# Patient Record
Sex: MALE | Race: WHITE | NOT HISPANIC OR LATINO | Employment: UNEMPLOYED | URBAN - METROPOLITAN AREA
[De-identification: names, ages, dates, MRNs, and addresses within clinical notes are randomized per-mention and may not be internally consistent; named-entity substitution may affect disease eponyms.]

---

## 2017-01-13 ENCOUNTER — OFFICE VISIT (OUTPATIENT)
Dept: FAMILY MEDICINE | Facility: CLINIC | Age: 62
End: 2017-01-13
Payer: COMMERCIAL

## 2017-01-13 VITALS
TEMPERATURE: 98.2 F | HEART RATE: 94 BPM | OXYGEN SATURATION: 97 % | HEIGHT: 71 IN | RESPIRATION RATE: 14 BRPM | BODY MASS INDEX: 27.3 KG/M2 | DIASTOLIC BLOOD PRESSURE: 90 MMHG | WEIGHT: 195 LBS | SYSTOLIC BLOOD PRESSURE: 142 MMHG

## 2017-01-13 DIAGNOSIS — I10 ESSENTIAL HYPERTENSION WITH GOAL BLOOD PRESSURE LESS THAN 140/90: Primary | ICD-10-CM

## 2017-01-13 PROCEDURE — 99214 OFFICE O/P EST MOD 30 MIN: CPT | Performed by: FAMILY MEDICINE

## 2017-01-13 RX ORDER — METOPROLOL SUCCINATE 100 MG/1
100 TABLET, EXTENDED RELEASE ORAL DAILY
Qty: 30 TABLET | Refills: 1 | Status: SHIPPED | OUTPATIENT
Start: 2017-01-13 | End: 2017-05-25

## 2017-01-13 NOTE — NURSING NOTE
"Chief Complaint   Patient presents with     Hypertension     follow up       Initial /90 mmHg  Pulse 94  Temp(Src) 98.2  F (36.8  C) (Oral)  Resp 14  Ht 5' 11\" (1.803 m)  Wt 195 lb (88.451 kg)  BMI 27.21 kg/m2  SpO2 97% Estimated body mass index is 27.21 kg/(m^2) as calculated from the following:    Height as of this encounter: 5' 11\" (1.803 m).    Weight as of this encounter: 195 lb (88.451 kg).  BP completed using cuff size: regular    Health Maintenance that is potentially due pending provider review:  Health Maintenance Due   Topic Date Due     ADVANCE DIRECTIVE PLANNING Q5 YRS (NO INBASKET)  09/06/1973     HEPATITIS C SCREENING  09/06/1973         Per provider    "

## 2017-01-13 NOTE — PROGRESS NOTES
"  SUBJECTIVE:                                                    Darron Schwarz is a 61 year old male who presents to clinic today for the following health issues:      Hypertension Follow-up      Outpatient blood pressures are being checked at home.  Results are 140\"s / 90\"s.    Low Salt Diet: no added salt       Amount of exercise or physical activity: 4-5 days/week for an average of 45-60 minutes    Problems taking medications regularly: No    Medication side effects: none    Diet: low salt        Problem list and histories reviewed & adjusted, as indicated.  Additional history: as documented    Patient Active Problem List   Diagnosis     Hypertension goal BP (blood pressure) < 140/90     Hyperlipidemia LDL goal <130     Moderate major depression (H)     Adjustment disorder with anxiety     Essential hypertension with goal blood pressure less than 140/90     Moderate episode of recurrent major depressive disorder (H)     Pain in thoracic spine     Past Surgical History   Procedure Laterality Date     C excisn coarct aorta drct anast       Hx aortic coaraction repair age 19     Hc tooth extraction w/forcep  age 21     wisdom teeth       Social History   Substance Use Topics     Smoking status: Never Smoker      Smokeless tobacco: Never Used     Alcohol Use: 0.0 oz/week     0 Standard drinks or equivalent per week      Comment: 2 glasses of wine per day     Family History   Problem Relation Age of Onset     Alcohol/Drug Father      Alcohol/Drug Brother      Psychotic Disorder Mother      Cardiovascular Sister      Cardiovascular Brother      Hypertension Sister      Hypertension Brother      Alzheimer Disease Sister          Current Outpatient Prescriptions   Medication Sig Dispense Refill     metoprolol (TOPROL-XL) 100 MG 24 hr tablet Take 1 tablet (100 mg) by mouth daily 30 tablet 1     HYDROcodone-acetaminophen (NORCO) 5-325 MG per tablet Take 1-2 tablets by mouth every 8 hours as needed for moderate to " severe pain maximum 2 tablet(s) per day 20 tablet 0     metoprolol (TOPROL-XL) 50 MG 24 hr tablet Take 1 tablet (50 mg) by mouth daily 90 tablet 1     lisinopril (PRINIVIL,ZESTRIL) 40 MG tablet Take 1 tablet (40 mg) by mouth daily 90 tablet 1     VITAMIN C 500 MG PO TABS 1 TABLET DAILY       tobramycin (TOBREX) 0.3 % ophthalmic solution Apply 1 drop to eye every 4 hours for 7 days. 1 Bottle 0     fenofibrate (TRICOR) 145 MG tablet Take 1 tablet by mouth daily. 90 tablet 1     FLUoxetine (PROZAC) 10 MG tablet Take 1 tablet by mouth daily. 90 tablet 1     Allergies   Allergen Reactions     Atorvastatin Calcium      malaise     Recent Labs   Lab Test  11/11/16   0931  09/21/16   0824 02/10/16 01/12/15  02/06/13   1023   02/23/10   0907   LDL   --   108*  110  101  93   < >  131*   HDL   --   46  40  39*  35*   < >  31*   TRIG   --   131  202*  145  111   < >  135   ALT  32  29   --    --   32   < >   --    CR  0.82  0.83   --    --   1.04   < >  1.16   GFRESTIMATED  >90  Non  GFR Calc    >90  Non  GFR Calc     --    --   74   < >  66   GFRESTBLACK  >90   GFR Calc    >90   GFR Calc     --    --   89   < >  79   POTASSIUM  4.9  4.2   --    --   5.2   < >  4.3   TSH   --    --    --    --    --    --   2.09    < > = values in this interval not displayed.      BP Readings from Last 3 Encounters:   01/13/17 142/90   11/11/16 150/90   09/09/16 140/90    Wt Readings from Last 3 Encounters:   01/13/17 195 lb (88.451 kg)   11/11/16 196 lb (88.905 kg)   09/09/16 191 lb (86.637 kg)                  Labs reviewed in EPIC  Problem list, Medication list, Allergies, and Medical/Social/Surgical histories reviewed in [facility] and updated as appropriate.    ROS:  Constitutional, HEENT, cardiovascular, pulmonary, GI, , musculoskeletal, neuro, skin, endocrine and psych systems are negative, except as otherwise noted.    OBJECTIVE:                                                  "   /90 mmHg  Pulse 94  Temp(Src) 98.2  F (36.8  C) (Oral)  Resp 14  Ht 5' 11\" (1.803 m)  Wt 195 lb (88.451 kg)  BMI 27.21 kg/m2  SpO2 97%  Body mass index is 27.21 kg/(m^2).  GENERAL: healthy, alert and no distress  EYES: Eyes grossly normal to inspection, PERRL and conjunctivae and sclerae normal  HENT: ear canals and TM's normal, nose and mouth without ulcers or lesions  NECK: no adenopathy, no asymmetry, masses, or scars and thyroid normal to palpation  RESP: lungs clear to auscultation - no rales, rhonchi or wheezes  CV: regular rate and rhythm, normal S1 S2, no S3 or S4, no murmur, click or rub, no peripheral edema and peripheral pulses strong  ABDOMEN: soft, nontender, no hepatosplenomegaly, no masses and bowel sounds normal  MS: no gross musculoskeletal defects noted, no edema  NEURO: Normal strength and tone, mentation intact and speech normal    Diagnostic Test Results:  No results found for this or any previous visit (from the past 24 hour(s)).     ASSESSMENT/PLAN:                                                        1. Essential hypertension with goal blood pressure less than 140/90  We discussed increasing the dose of the Metoprolol as currently his BP is high , he has been checking at home and seems to be running in the upper 140s and sometimes 150 over 90s , he used to be on Atenolol but then got switched to the metoprolol and has been on the 50mg XL dose for a while , he denies any side effects , no chest pain, no SOB .  - metoprolol (TOPROL-XL) 100 MG 24 hr tablet; Take 1 tablet (100 mg) by mouth daily  Dispense: 30 tablet; Refill: 1  Would need to come for a f/u appointment in 2 to 3 weeks ,  RTC if no improving or worsening.  Pt is aware  and comfortable with the current plan.      Eli Sue MD  [facility]  HPI      ROS      Physical Exam      "

## 2017-05-12 DIAGNOSIS — I10 ESSENTIAL HYPERTENSION WITH GOAL BLOOD PRESSURE LESS THAN 140/90: ICD-10-CM

## 2017-05-12 NOTE — TELEPHONE ENCOUNTER
Noted at 1/13/2017 appt with LS:   Essential hypertension with goal blood pressure less than 140/90  We discussed increasing the dose of the Metoprolol as currently his BP is high , he has been checking at home and   seems to be running in the upper 140s and sometimes 150 over 90s , he used to be on Atenolol but then got switched   to the metoprolol and has been on the 50mg XL dose for a while , he denies any side effects , no chest pain, no SOB .  - metoprolol (TOPROL-XL) 100 MG 24 hr tablet; Take 1 tablet (100 mg) by mouth daily Dispense: 30 tablet; Refill: 1  Would need to come for a f/u appointment in 2 to 3 weeks    Left non detailed VM for pt asking that they callback and schedule (med check)  Kamala GRANDA RN

## 2017-05-12 NOTE — TELEPHONE ENCOUNTER
Lisinopril       Last Written Prescription Date: 11/09/2016  Last Fill Quantity: 90, # refills: 1  Last Office Visit with G, P or [facility] prescribing provider: 01/13/2017       Potassium   Date Value Ref Range Status   11/11/2016 4.9 3.4 - 5.3 mmol/L Final     Creatinine   Date Value Ref Range Status   11/11/2016 0.82 0.66 - 1.25 mg/dL Final     BP Readings from Last 3 Encounters:   01/13/17 142/90   11/11/16 150/90   09/09/16 140/90

## 2017-05-16 RX ORDER — LISINOPRIL 40 MG/1
TABLET ORAL
Qty: 90 TABLET | Refills: 1 | Status: SHIPPED | OUTPATIENT
Start: 2017-05-16 | End: 2017-06-02

## 2017-05-16 NOTE — TELEPHONE ENCOUNTER
LS,  Left  #2 for pt  See below messages  No response from our outreach  Please advise on refill  Kamala GRANDA RN

## 2017-05-25 ENCOUNTER — TELEPHONE (OUTPATIENT)
Dept: FAMILY MEDICINE | Facility: CLINIC | Age: 62
End: 2017-05-25

## 2017-05-25 DIAGNOSIS — I10 ESSENTIAL HYPERTENSION WITH GOAL BLOOD PRESSURE LESS THAN 140/90: ICD-10-CM

## 2017-05-25 RX ORDER — METOPROLOL SUCCINATE 100 MG/1
100 TABLET, EXTENDED RELEASE ORAL DAILY
Qty: 90 TABLET | Refills: 1 | Status: SHIPPED | OUTPATIENT
Start: 2017-05-25 | End: 2017-12-11

## 2017-05-25 NOTE — TELEPHONE ENCOUNTER
Reason for Call:  Medication or medication refill:    Do you use a [facility]?  Name of the pharmacy and phone number for the current request:       [facility] [address] [place] [address] AT [address] [place] [address]        Name of the medication requested: metoprolol    Other request: pt has been sched for [facility] appt. Please refill rx's    Can we leave a detailed message on this number? YES    Phone number patient can be reached at: Home number on file [phone] (home)    Best Time: any    Call taken on 5/25/2017 at 12:24 PM by Blayne Lyons

## 2017-05-25 NOTE — TELEPHONE ENCOUNTER
LS,  Routing refill request to provider for review/approval because:  A break in medication  Last rxd 1/13/17 #30 R 1.  Please authorize if appropriate.  Thanks,  Diamante Ortiz RN      metoprolol      Last Written Prescription Date: 1/13/17  Last Fill Quantity: 30, # refills: 0  Last Office Visit with [facility], [facility] or [facility] prescribing provider: 1/13/17  Next 5 appointments (look out 90 days)     Jun 02, 2017  9:00 AM CDT   Office Visit with Eli Sue MD   [facility] ([facility])    [address] [facility] [id number] [phone]                   Potassium   Date Value Ref Range Status   11/11/2016 4.9 3.4 - 5.3 mmol/L Final     Creatinine   Date Value Ref Range Status   11/11/2016 0.82 0.66 - 1.25 mg/dL Final     BP Readings from Last 3 Encounters:   01/13/17 142/90   11/11/16 150/90   09/09/16 140/90

## 2017-06-02 ENCOUNTER — OFFICE VISIT (OUTPATIENT)
Dept: FAMILY MEDICINE | Facility: CLINIC | Age: 62
End: 2017-06-02
Payer: COMMERCIAL

## 2017-06-02 VITALS
DIASTOLIC BLOOD PRESSURE: 92 MMHG | SYSTOLIC BLOOD PRESSURE: 130 MMHG | TEMPERATURE: 98.4 F | HEIGHT: 71 IN | OXYGEN SATURATION: 99 % | BODY MASS INDEX: 28.24 KG/M2 | WEIGHT: 201.7 LBS | HEART RATE: 84 BPM | RESPIRATION RATE: 15 BRPM

## 2017-06-02 DIAGNOSIS — I10 ESSENTIAL HYPERTENSION WITH GOAL BLOOD PRESSURE LESS THAN 140/90: ICD-10-CM

## 2017-06-02 LAB
ALBUMIN SERPL-MCNC: 4 G/DL (ref 3.4–5)
ALP SERPL-CCNC: 71 U/L (ref 40–150)
ALT SERPL W P-5'-P-CCNC: 41 U/L (ref 0–70)
ANION GAP SERPL CALCULATED.3IONS-SCNC: 10 MMOL/L (ref 3–14)
AST SERPL W P-5'-P-CCNC: 38 U/L (ref 0–45)
BILIRUB SERPL-MCNC: 0.6 MG/DL (ref 0.2–1.3)
BUN SERPL-MCNC: 14 MG/DL (ref 7–30)
CALCIUM SERPL-MCNC: 8.9 MG/DL (ref 8.5–10.1)
CHLORIDE SERPL-SCNC: 107 MMOL/L (ref 94–109)
CHOLEST SERPL-MCNC: 193 MG/DL
CO2 SERPL-SCNC: 26 MMOL/L (ref 20–32)
CREAT SERPL-MCNC: 0.96 MG/DL (ref 0.66–1.25)
GFR SERPL CREATININE-BSD FRML MDRD: 79 ML/MIN/1.7M2
GLUCOSE SERPL-MCNC: 78 MG/DL (ref 70–99)
HDLC SERPL-MCNC: 37 MG/DL
LDLC SERPL CALC-MCNC: 92 MG/DL
NONHDLC SERPL-MCNC: 156 MG/DL
POTASSIUM SERPL-SCNC: 4.7 MMOL/L (ref 3.4–5.3)
PROT SERPL-MCNC: 7.6 G/DL (ref 6.8–8.8)
SODIUM SERPL-SCNC: 143 MMOL/L (ref 133–144)
TRIGL SERPL-MCNC: 322 MG/DL

## 2017-06-02 PROCEDURE — 80061 LIPID PANEL: CPT | Performed by: FAMILY MEDICINE

## 2017-06-02 PROCEDURE — 36415 COLL VENOUS BLD VENIPUNCTURE: CPT | Performed by: FAMILY MEDICINE

## 2017-06-02 PROCEDURE — 99214 OFFICE O/P EST MOD 30 MIN: CPT | Performed by: FAMILY MEDICINE

## 2017-06-02 PROCEDURE — 80053 COMPREHEN METABOLIC PANEL: CPT | Performed by: FAMILY MEDICINE

## 2017-06-02 RX ORDER — LISINOPRIL 40 MG/1
40 TABLET ORAL DAILY
Qty: 30 TABLET | Refills: 1 | Status: SHIPPED | OUTPATIENT
Start: 2017-06-02 | End: 2017-06-02

## 2017-06-02 RX ORDER — LISINOPRIL 40 MG/1
40 TABLET ORAL DAILY
Qty: 90 TABLET | Refills: 1 | Status: SHIPPED | OUTPATIENT
Start: 2017-06-02 | End: 2017-12-11

## 2017-06-02 NOTE — PROGRESS NOTES
SUBJECTIVE:                                                    Darron Schwarz is a 61 year old male who presents to clinic today for the following health issues:      Hypertension Follow-up     Outpatient blood pressures are being checked at home.  Results are 140/80.    Low Salt Diet: not monitoring salt       Amount of exercise or physical activity: 4-5 days/week for an average of 45-60 minutes    Problems taking medications regularly: No (was out of his medications for a few days though, so he thinks that is why his numbers are a bit higher.)    Medication side effects: none    Diet: regular (no restrictions)          Problem list and histories reviewed & adjusted, as indicated.  Additional history: as documented    Patient Active Problem List   Diagnosis     Hypertension goal BP (blood pressure) < 140/90     Hyperlipidemia LDL goal <130     Moderate major depression (H)     Adjustment disorder with anxiety     Essential hypertension with goal blood pressure less than 140/90     Moderate episode of recurrent major depressive disorder (H)     Past Surgical History:   Procedure Laterality Date     C EXCISN COARCT AORTA DRCT ANAST      Hx aortic coaraction repair age 19     HC TOOTH EXTRACTION W/FORCEP  age 21    wisdom teeth       Social History   Substance Use Topics     Smoking status: Never Smoker     Smokeless tobacco: Never Used     Alcohol use 0.0 oz/week     0 Standard drinks or equivalent per week      Comment: 2 glasses of wine per day     Family History   Problem Relation Age of Onset     Psychotic Disorder Mother      Alcohol/Drug Father      Alcohol/Drug Brother      Cardiovascular Sister      Cardiovascular Brother      Hypertension Sister      Hypertension Brother      Alzheimer Disease Sister          Current Outpatient Prescriptions   Medication Sig Dispense Refill     lisinopril (PRINIVIL/ZESTRIL) 40 MG tablet Take 1 tablet (40 mg) by mouth daily 90 tablet 1     metoprolol (TOPROL-XL) 100 MG  24 hr tablet Take 1 tablet (100 mg) by mouth daily 90 tablet 1     VITAMIN C 500 MG PO TABS 1 TABLET DAILY       [DISCONTINUED] lisinopril (PRINIVIL/ZESTRIL) 40 MG tablet Take 1 tablet (40 mg) by mouth daily 30 tablet 1     [DISCONTINUED] lisinopril (PRINIVIL/ZESTRIL) 40 MG tablet TAKE 1 TABLET (40 MG) BY MOUTH DAILY 90 tablet 1     [DISCONTINUED] metoprolol (TOPROL-XL) 50 MG 24 hr tablet Take 1 tablet (50 mg) by mouth daily 90 tablet 1     Allergies   Allergen Reactions     Atorvastatin Calcium      malaise     Recent Labs   Lab Test  11/11/16   0931  09/21/16   0824 02/10/16 01/12/15  02/06/13   1023   02/23/10   0907   LDL   --   108*  110  101  93   < >  131*   HDL   --   46  40  39*  35*   < >  31*   TRIG   --   131  202*  145  111   < >  135   ALT  32  29   --    --   32   < >   --    CR  0.82  0.83   --    --   1.04   < >  1.16   GFRESTIMATED  >90  Non  GFR Calc    >90  Non  GFR Calc     --    --   74   < >  66   GFRESTBLACK  >90   GFR Calc    >90   GFR Calc     --    --   89   < >  79   POTASSIUM  4.9  4.2   --    --   5.2   < >  4.3   TSH   --    --    --    --    --    --   2.09    < > = values in this interval not displayed.      BP Readings from Last 3 Encounters:   06/02/17 (!) 130/92   01/13/17 142/90   11/11/16 150/90    Wt Readings from Last 3 Encounters:   06/02/17 201 lb 11.2 oz (91.5 kg)   01/13/17 195 lb (88.5 kg)   11/11/16 196 lb (88.9 kg)                  Labs reviewed in EPIC    Reviewed and updated as needed this visit by clinical staff  Tobacco  Allergies  Meds  Med Hx  Surg Hx  Fam Hx  Soc Hx      Reviewed and updated as needed this visit by Provider         ROS:  Constitutional, HEENT, cardiovascular, pulmonary, GI, , musculoskeletal, neuro, skin, endocrine and psych systems are negative, except as otherwise noted.    OBJECTIVE:                                                    BP (!) 130/92  Pulse 84  Temp 98.4  F  "(36.9  C) (Oral)  Resp 15  Ht 5' 11\" (1.803 m)  Wt 201 lb 11.2 oz (91.5 kg)  SpO2 99%  BMI 28.13 kg/m2  Body mass index is 28.13 kg/(m^2).  GENERAL: healthy, alert and no distress  EYES: Eyes grossly normal to inspection, PERRL and conjunctivae and sclerae normal  HENT: ear canals and TM's normal, nose and mouth without ulcers or lesions  NECK: no adenopathy, no asymmetry, masses, or scars and thyroid normal to palpation  RESP: lungs clear to auscultation - no rales, rhonchi or wheezes  CV: regular rate and rhythm, normal S1 S2, no S3 or S4, no murmur, click or rub, no peripheral edema and peripheral pulses strong  ABDOMEN: soft, nontender, no hepatosplenomegaly, no masses and bowel sounds normal  MS: no gross musculoskeletal defects noted, no edema  NEURO: Normal strength and tone, mentation intact and speech normal    Diagnostic Test Results:  No results found for this or any previous visit (from the past 24 hour(s)).     ASSESSMENT/PLAN:                                                        1. Essential hypertension with goal blood pressure less than 140/90  His BP is high today because he has been off his lisinopril, had five days off it before it was delivered by the mail order pharmacy, send a month Rx at [facility] so he can start right away , also recheck BP in one month here , sooner if any concerns or symptoms/  - lisinopril (PRINIVIL/ZESTRIL) 40 MG tablet; Take 1 tablet (40 mg) by mouth daily  Dispense: 90 tablet; Refill: 1  - Comprehensive metabolic panel (BMP + Alb, Alk Phos, ALT, AST, Total. Bili, TP)  - Lipid Profile (Chol, Trig, HDL, LDL calc)    In one month . Pt is aware  and comfortable with the current plan.      Eli Sue MD  [facility]    "

## 2017-06-02 NOTE — NURSING NOTE
"Chief Complaint   Patient presents with     Medication Reconciliation     Hypertension       Initial BP (!) 130/92  Pulse 84  Temp 98.4  F (36.9  C) (Oral)  Resp 15  Ht 5' 11\" (1.803 m)  Wt 201 lb 11.2 oz (91.5 kg)  SpO2 99%  BMI 28.13 kg/m2 Estimated body mass index is 28.13 kg/(m^2) as calculated from the following:    Height as of this encounter: 5' 11\" (1.803 m).    Weight as of this encounter: 201 lb 11.2 oz (91.5 kg).  Medication Reconciliation: complete    Health Maintenance Due Pending Provider Review:  PHQ9    Completed today.    Elodia Cheatham MA  [facility]  "

## 2017-06-02 NOTE — MR AVS SNAPSHOT
"              After Visit Summary   6/2/2017    Darron Schwarz    MRN: [id number]           Patient Information     Date Of Birth          1955        Visit Information        Provider Department      6/2/2017 9:00 AM Eli Sue MD [facility]        Today's Diagnoses     Essential hypertension with goal blood pressure less than 140/90           Follow-ups after your visit        Who to contact     If you have questions or need follow up information about today's clinic visit or your schedule please contact [facility] directly at [phone].  Normal or non-critical lab and imaging results will be communicated to you by [employer], letter or phone within 4 business days after the clinic has received the results. If you do not hear from us within 7 days, please contact the clinic through [employer] or phone. If you have a critical or abnormal lab result, we will notify you by phone as soon as possible.  Submit refill requests through [employer] or call your pharmacy and they will forward the refill request to us. Please allow 3 business days for your refill to be completed.          Additional Information About Your Visit        MyChart Information     [employer] gives you secure access to your electronic health record. If you see a primary care provider, you can also send messages to your care team and make appointments. If you have questions, please call your primary care clinic.  If you do not have a primary care provider, please call [phone] and they will assist you.        Care EveryWhere ID     This is your Care EveryWhere ID. This could be used by other organizations to access your [place] medical records  [id number]        Your Vitals Were     Pulse Temperature Respirations Height Pulse Oximetry BMI (Body Mass Index)    84 98.4  F (36.9  C) (Oral) 15 5' 11\" (1.803 m) 99% 28.13 kg/m2       Blood Pressure from Last 3 Encounters:   06/02/17 (!) 130/92   01/13/17 142/90   11/11/16 " 150/90    Weight from Last 3 Encounters:   06/02/17 201 lb 11.2 oz (91.5 kg)   01/13/17 195 lb (88.5 kg)   11/11/16 196 lb (88.9 kg)              We Performed the Following     Comprehensive metabolic panel (BMP + Alb, Alk Phos, ALT, AST, Total. Bili, TP)     Lipid Profile (Chol, Trig, HDL, LDL calc)          Today's Medication Changes          These changes are accurate as of: 6/2/17 10:40 AM.  If you have any questions, ask your nurse or doctor.               Start taking these medicines.        Dose/Directions    lisinopril 40 MG tablet   Commonly known as:  PRINIVIL/ZESTRIL   Used for:  Essential hypertension with goal blood pressure less than 140/90   Started by:  Eli Sue MD        Dose:  40 mg   Take 1 tablet (40 mg) by mouth daily   Quantity:  90 tablet   Refills:  1            Where to get your medicines      These medications were sent to [facility] - [address] [address]     Phone:  [phone]     lisinopril 40 MG tablet                Primary Care Provider    None       No address on file        Thank you!     Thank you for choosing [facility]  for your care. Our goal is always to provide you with excellent care. Hearing back from our patients is one way we can continue to improve our services. Please take a few minutes to complete the written survey that you may receive in the mail after your visit with us. Thank you!             Your Updated Medication List - Protect others around you: Learn how to safely use, store and throw away your medicines at www.disposemymeds.org.          This list is accurate as of: 6/2/17 10:40 AM.  Always use your most recent med list.                   Brand Name Dispense Instructions for use    ascorbic acid 500 MG tablet    VITAMIN C     1 TABLET DAILY       lisinopril 40 MG tablet    PRINIVIL/ZESTRIL    90 tablet    Take 1 tablet (40 mg) by mouth daily       metoprolol 100  MG 24 hr tablet    TOPROL-XL    90 tablet    Take 1 tablet (100 mg) by mouth daily

## 2017-06-03 ASSESSMENT — PATIENT HEALTH QUESTIONNAIRE - PHQ9: SUM OF ALL RESPONSES TO PHQ QUESTIONS 1-9: 0

## 2017-12-11 DIAGNOSIS — I10 ESSENTIAL HYPERTENSION WITH GOAL BLOOD PRESSURE LESS THAN 140/90: ICD-10-CM

## 2017-12-11 RX ORDER — METOPROLOL SUCCINATE 100 MG/1
TABLET, EXTENDED RELEASE ORAL
Qty: 30 TABLET | Refills: 0 | Status: SHIPPED | OUTPATIENT
Start: 2017-12-11 | End: 2018-01-24

## 2017-12-11 RX ORDER — LISINOPRIL 40 MG/1
TABLET ORAL
Qty: 30 TABLET | Refills: 0 | Status: SHIPPED | OUTPATIENT
Start: 2017-12-11 | End: 2018-01-24

## 2017-12-11 NOTE — TELEPHONE ENCOUNTER
Medication is being filled for 1 time refill only due to:  Patient needs to be seen because due for follow up.   Last OV 6/2/17. Follow up in 1 month.  Sarah Randolph RN    Lisinopril and Metoprolol      Last Written Prescription Date: 6/2/2017 - 5/25/2017  Last Fill Quantity: 90, # refills: 1  Last Office Visit with [facility], [facility] or [facility] prescribing provider: 6/2/2017       Potassium   Date Value Ref Range Status   06/02/2017 4.7 3.4 - 5.3 mmol/L Final     Creatinine   Date Value Ref Range Status   06/02/2017 0.96 0.66 - 1.25 mg/dL Final     BP Readings from Last 3 Encounters:   06/02/17 (!) 130/92   01/13/17 142/90   11/11/16 150/90

## 2018-01-24 ENCOUNTER — OFFICE VISIT (OUTPATIENT)
Dept: FAMILY MEDICINE | Facility: CLINIC | Age: 63
End: 2018-01-24
Payer: COMMERCIAL

## 2018-01-24 VITALS
WEIGHT: 216.5 LBS | DIASTOLIC BLOOD PRESSURE: 100 MMHG | TEMPERATURE: 97.5 F | SYSTOLIC BLOOD PRESSURE: 155 MMHG | OXYGEN SATURATION: 96 % | BODY MASS INDEX: 30.31 KG/M2 | HEART RATE: 89 BPM | HEIGHT: 71 IN

## 2018-01-24 DIAGNOSIS — I10 ESSENTIAL HYPERTENSION WITH GOAL BLOOD PRESSURE LESS THAN 140/90: ICD-10-CM

## 2018-01-24 DIAGNOSIS — Z23 NEED FOR INFLUENZA VACCINATION: Primary | ICD-10-CM

## 2018-01-24 PROCEDURE — 99213 OFFICE O/P EST LOW 20 MIN: CPT | Mod: 25 | Performed by: FAMILY MEDICINE

## 2018-01-24 PROCEDURE — 90471 IMMUNIZATION ADMIN: CPT | Performed by: FAMILY MEDICINE

## 2018-01-24 PROCEDURE — 90686 IIV4 VACC NO PRSV 0.5 ML IM: CPT | Performed by: FAMILY MEDICINE

## 2018-01-24 RX ORDER — METOPROLOL SUCCINATE 100 MG/1
100 TABLET, EXTENDED RELEASE ORAL DAILY
Qty: 10 TABLET | Refills: 0 | Status: SHIPPED | OUTPATIENT
Start: 2018-01-24 | End: 2018-09-04

## 2018-01-24 RX ORDER — METOPROLOL SUCCINATE 100 MG/1
100 TABLET, EXTENDED RELEASE ORAL DAILY
Qty: 90 TABLET | Refills: 1 | Status: SHIPPED | OUTPATIENT
Start: 2018-01-24 | End: 2018-01-24

## 2018-01-24 RX ORDER — LISINOPRIL 40 MG/1
40 TABLET ORAL DAILY
Qty: 90 TABLET | Refills: 1 | Status: SHIPPED | OUTPATIENT
Start: 2018-01-24 | End: 2018-01-24

## 2018-01-24 RX ORDER — METOPROLOL SUCCINATE 100 MG/1
100 TABLET, EXTENDED RELEASE ORAL DAILY
Qty: 90 TABLET | Refills: 1 | Status: SHIPPED | OUTPATIENT
Start: 2018-01-24 | End: 2018-08-31

## 2018-01-24 RX ORDER — METOPROLOL SUCCINATE 100 MG/1
100 TABLET, EXTENDED RELEASE ORAL DAILY
Qty: 10 TABLET | Refills: 0 | Status: SHIPPED | OUTPATIENT
Start: 2018-01-24 | End: 2018-01-24

## 2018-01-24 RX ORDER — LISINOPRIL 40 MG/1
40 TABLET ORAL DAILY
Qty: 90 TABLET | Refills: 1 | Status: SHIPPED | OUTPATIENT
Start: 2018-01-24 | End: 2018-10-02

## 2018-01-24 RX ORDER — LISINOPRIL 40 MG/1
40 TABLET ORAL DAILY
Qty: 30 TABLET | Refills: 0 | Status: CANCELLED | OUTPATIENT
Start: 2018-01-24

## 2018-01-24 NOTE — MR AVS SNAPSHOT
After Visit Summary   1/24/2018    Darron Schwarz    MRN: [id number]           Patient Information     Date Of Birth          1955        Visit Information        Provider Department      1/24/2018 12:30 PM Eli Sue MD [facility]        Today's Diagnoses     Need for influenza vaccination    -  1    Essential hypertension with goal blood pressure less than 140/90           Follow-ups after your visit        Future tests that were ordered for you today     Open Future Orders        Priority Expected Expires Ordered    Lipid Profile (Chol, Trig, HDL, LDL calc) Routine  6/24/2018 1/24/2018    Comprehensive metabolic panel (BMP + Alb, Alk Phos, ALT, AST, Total. Bili, TP) Routine  6/24/2018 1/24/2018    TSH Routine  6/24/2018 1/24/2018            Who to contact     If you have questions or need follow up information about today's clinic visit or your schedule please contact [facility] directly at [phone].  Normal or non-critical lab and imaging results will be communicated to you by [email or web address], letter or phone within 4 business days after the clinic has received the results. If you do not hear from us within 7 days, please contact the clinic through [employer] or phone. If you have a critical or abnormal lab result, we will notify you by phone as soon as possible.  Submit refill requests through [employer] or call your pharmacy and they will forward the refill request to us. Please allow 3 business days for your refill to be completed.          Additional Information About Your Visit        MyChart Information     [employer] gives you secure access to your electronic health record. If you see a primary care provider, you can also send messages to your care team and make appointments. If you have questions, please call your primary care clinic.  If you do not have a primary care provider, please call [phone] and they will assist you.        Care EveryWhere ID     This is  "your Care EveryWhere ID. This could be used by other organizations to access your [facility] medical records  [id number]        Your Vitals Were     Pulse Temperature Height Pulse Oximetry BMI (Body Mass Index)       89 97.5  F (36.4  C) (Oral) 5' 11\" (1.803 m) 96% 30.2 kg/m2        Blood Pressure from Last 3 Encounters:   01/24/18 (!) 155/100   06/02/17 (!) 130/92   01/13/17 142/90    Weight from Last 3 Encounters:   01/24/18 216 lb 8 oz (98.2 kg)   06/02/17 201 lb 11.2 oz (91.5 kg)   01/13/17 195 lb (88.5 kg)              We Performed the Following     HC FLU VAC PRESRV FREE QUAD SPLIT VIR 3+YRS IM     VACCINE ADMINISTRATION, INITIAL          Today's Medication Changes          These changes are accurate as of 1/24/18 11:59 PM.  If you have any questions, ask your nurse or doctor.               Start taking these medicines.        Dose/Directions    lisinopril 40 MG tablet   Commonly known as:  PRINIVIL/ZESTRIL   Used for:  Essential hypertension with goal blood pressure less than 140/90   Started by:  Eli Sue MD        Dose:  40 mg   Take 1 tablet (40 mg) by mouth daily   Quantity:  90 tablet   Refills:  1       * metoprolol succinate 100 MG 24 hr tablet   Commonly known as:  TOPROL-XL   Used for:  Essential hypertension with goal blood pressure less than 140/90   Started by:  Eli Sue MD        Dose:  100 mg   Take 1 tablet (100 mg) by mouth daily   Quantity:  90 tablet   Refills:  1       * metoprolol succinate 100 MG 24 hr tablet   Commonly known as:  TOPROL-XL   Used for:  Essential hypertension with goal blood pressure less than 140/90   Started by:  Eli Sue MD        Dose:  100 mg   Take 1 tablet (100 mg) by mouth daily   Quantity:  10 tablet   Refills:  0       * Notice:  This list has 2 medication(s) that are the same as other medications prescribed for you. Read the directions carefully, and ask your doctor or other care provider to review them with you.         Where to get your " medicines      These medications were sent to [facility] - [place], [place] - [address]  [address] [address]     Phone:  [phone]     lisinopril 40 MG tablet    metoprolol succinate 100 MG 24 hr tablet         These medications were sent to [id number] - [place] [address] & [facility]  [address] [place] [address]     Phone:  [phone]     metoprolol succinate 100 MG 24 hr tablet                Primary Care Provider Office Phone # Fax #    Eli Sue [phone] [address] [address]        Equal Access to Services     FEDERICO SESAY : Hadii aad ku hadasho Soomaali, waaxda luqadaha, qaybta kaalmada adeegyada, ari alan. So [facility] [phone].    ATENCIÓN: Si habla español, tiene a bowser disposición servicios gratuitos de asistencia lingüística. [facility] [phone].    We comply with applicable federal civil rights laws and [place] laws. We do not discriminate on the basis of race, color, national origin, age, disability, sex, sexual orientation, or gender identity.            Thank you!     Thank you for choosing [facility]  for your care. Our goal is always to provide you with excellent care. Hearing back from our patients is one way we can continue to improve our services. Please take a few minutes to complete the written survey that you may receive in the mail after your visit with us. Thank you!             Your Updated Medication List - Protect others around you: Learn how to safely use, store and throw away your medicines at www.disposemymeds.org.          This list is accurate as of 1/24/18 11:59 PM.  Always use your most recent med list.                   Brand Name Dispense Instructions for use Diagnosis    ascorbic acid 500 MG tablet    VITAMIN C     1 TABLET DAILY        lisinopril 40 MG  tablet    PRINIVIL/ZESTRIL    90 tablet    Take 1 tablet (40 mg) by mouth daily    Essential hypertension with goal blood pressure less than 140/90       * metoprolol succinate 100 MG 24 hr tablet    TOPROL-XL    90 tablet    Take 1 tablet (100 mg) by mouth daily    Essential hypertension with goal blood pressure less than 140/90       * metoprolol succinate 100 MG 24 hr tablet    TOPROL-XL    10 tablet    Take 1 tablet (100 mg) by mouth daily    Essential hypertension with goal blood pressure less than 140/90       * Notice:  This list has 2 medication(s) that are the same as other medications prescribed for you. Read the directions carefully, and ask your doctor or other care provider to review them with you.

## 2018-01-24 NOTE — PROGRESS NOTES
SUBJECTIVE:   Darron Schwarz is a 62 year old male who presents to clinic today for the following health issues:      Hypertension Follow-up      Outpatient blood pressures are being checked at home.  Results are 140s/80s.    Low Salt Diet: no added salt      Amount of exercise or physical activity: 3-4 days/week for an average of 45-60 minutes    Problems taking medications regularly: No    Medication side effects: none    Diet: regular (no restrictions)            Problem list and histories reviewed & adjusted, as indicated.  Additional history: as documented    Patient Active Problem List   Diagnosis     Hypertension goal BP (blood pressure) < 140/90     Hyperlipidemia LDL goal <130     Moderate major depression (H)     Adjustment disorder with anxiety     Essential hypertension with goal blood pressure less than 140/90     Moderate episode of recurrent major depressive disorder (H)     Past Surgical History:   Procedure Laterality Date     C EXCISN COARCT AORTA DRCT ANAST      Hx aortic coaraction repair age 19     HC TOOTH EXTRACTION W/FORCEP  age 21    wisdom teeth       Social History   Substance Use Topics     Smoking status: Never Smoker     Smokeless tobacco: Never Used     Alcohol use 0.0 oz/week     0 Standard drinks or equivalent per week      Comment: 2 glasses of wine per day     Family History   Problem Relation Age of Onset     Psychotic Disorder Mother      Alcohol/Drug Father      Alcohol/Drug Brother      Cardiovascular Sister      Cardiovascular Brother      Hypertension Sister      Hypertension Brother      Alzheimer Disease Sister          Current Outpatient Prescriptions   Medication Sig Dispense Refill     lisinopril (PRINIVIL/ZESTRIL) 40 MG tablet Take 1 tablet (40 mg) by mouth daily 90 tablet 1     metoprolol succinate (TOPROL-XL) 100 MG 24 hr tablet Take 1 tablet (100 mg) by mouth daily 90 tablet 1     metoprolol succinate (TOPROL-XL) 100 MG 24 hr tablet Take 1 tablet (100 mg) by  "mouth daily 10 tablet 0     VITAMIN C 500 MG PO TABS 1 TABLET DAILY       Allergies   Allergen Reactions     Atorvastatin Calcium      malaise     Recent Labs   Lab Test  06/02/17   0948  11/11/16   0931  09/21/16   0824 02/10/16   02/23/10   0907   LDL  92   --   108*  110   < >  131*   HDL  37*   --   46  40   < >  31*   TRIG  322*   --   131  202*   < >  135   ALT  41  32  29   --    < >   --    CR  0.96  0.82  0.83   --    < >  1.16   GFRESTIMATED  79  >90  Non  GFR Calc    >90  Non  GFR Calc     --    < >  66   GFRESTBLACK  >90   GFR Calc    >90   GFR Calc    >90   GFR Calc     --    < >  79   POTASSIUM  4.7  4.9  4.2   --    < >  4.3   TSH   --    --    --    --    --   2.09    < > = values in this interval not displayed.      BP Readings from Last 3 Encounters:   01/24/18 (!) 155/100   06/02/17 (!) 130/92   01/13/17 142/90    Wt Readings from Last 3 Encounters:   01/24/18 216 lb 8 oz (98.2 kg)   06/02/17 201 lb 11.2 oz (91.5 kg)   01/13/17 195 lb (88.5 kg)                  Labs reviewed in EPIC    Reviewed and updated as needed this visit by clinical staff  Tobacco       Reviewed and updated as needed this visit by Provider         ROS:  Constitutional, HEENT, cardiovascular, pulmonary, GI, , musculoskeletal, neuro, skin, endocrine and psych systems are negative, except as otherwise noted.    OBJECTIVE:     BP (!) 155/100  Pulse 89  Temp 97.5  F (36.4  C) (Oral)  Ht 5' 11\" (1.803 m)  Wt 216 lb 8 oz (98.2 kg)  SpO2 96%  BMI 30.2 kg/m2  Body mass index is 30.2 kg/(m^2).  GENERAL: healthy, alert and no distress  EYES: Eyes grossly normal to inspection, PERRL and conjunctivae and sclerae normal  HENT: normal cephalic/atraumatic, ear canals and TM's normal, nose and mouth without ulcers or lesions, oropharynx clear and oral mucous membranes moist  NECK: no adenopathy, no asymmetry, masses, or scars and thyroid normal to " palpation  RESP: lungs clear to auscultation - no rales, rhonchi or wheezes  CV: regular rate and rhythm, normal S1 S2, no S3 or S4, no murmur, click or rub, no peripheral edema and peripheral pulses strong  ABDOMEN: soft, nontender, no hepatosplenomegaly, no masses and bowel sounds normal  MS: no gross musculoskeletal defects noted, no edema  NEURO: Normal strength and tone, mentation intact and speech normal    Diagnostic Test Results:  Results for orders placed or performed in visit on 06/02/17   Comprehensive metabolic panel (BMP + Alb, Alk Phos, ALT, AST, Total. Bili, TP)   Result Value Ref Range    Sodium 143 133 - 144 mmol/L    Potassium 4.7 3.4 - 5.3 mmol/L    Chloride 107 94 - 109 mmol/L    Carbon Dioxide 26 20 - 32 mmol/L    Anion Gap 10 3 - 14 mmol/L    Glucose 78 70 - 99 mg/dL    Urea Nitrogen 14 7 - 30 mg/dL    Creatinine 0.96 0.66 - 1.25 mg/dL    GFR Estimate 79 >60 mL/min/1.7m2    GFR Estimate If Black >90   GFR Calc   >60 mL/min/1.7m2    Calcium 8.9 8.5 - 10.1 mg/dL    Bilirubin Total 0.6 0.2 - 1.3 mg/dL    Albumin 4.0 3.4 - 5.0 g/dL    Protein Total 7.6 6.8 - 8.8 g/dL    Alkaline Phosphatase 71 40 - 150 U/L    ALT 41 0 - 70 U/L    AST 38 0 - 45 U/L   Lipid Profile (Chol, Trig, HDL, LDL calc)   Result Value Ref Range    Cholesterol 193 <200 mg/dL    Triglycerides 322 (H) <150 mg/dL    HDL Cholesterol 37 (L) >39 mg/dL    LDL Cholesterol Calculated 92 <100 mg/dL    Non HDL Cholesterol 156 (H) <130 mg/dL       ASSESSMENT/PLAN:             1. Essential hypertension with goal blood pressure less than 140/90  Discussed BP is high , he has been off the metoprolol for 4 days , refilled, he will come back for fasting labs and then schedule his annual PE , we will discuss labs at that appointment .  - Lipid Profile (Chol, Trig, HDL, LDL calc); Future  - Comprehensive metabolic panel (BMP + Alb, Alk Phos, ALT, AST, Total. Bili, TP); Future  - TSH; Future  - VACCINE ADMINISTRATION, INITIAL  -  lisinopril (PRINIVIL/ZESTRIL) 40 MG tablet; Take 1 tablet (40 mg) by mouth daily  Dispense: 90 tablet; Refill: 1  - metoprolol succinate (TOPROL-XL) 100 MG 24 hr tablet; Take 1 tablet (100 mg) by mouth daily  Dispense: 90 tablet; Refill: 1  - metoprolol succinate (TOPROL-XL) 100 MG 24 hr tablet; Take 1 tablet (100 mg) by mouth daily  Dispense: 10 tablet; Refill: 0    2. Need for influenza vaccination  Got his flu shot   -  FLU VAC PRESRV FREE QUAD SPLIT VIR 3+YRS IM    RTC if no improving or worsening.  Pt is aware  and comfortable with the current plan.      Eli Sue MD  [facility]

## 2018-01-24 NOTE — NURSING NOTE
Chief Complaint   Patient presents with     Hypertension   Regular Adult Flu vaccine ordered by provider-verified by Adriana Vergara MA and given by ELIDA Quiñones CMA   following pt verification.ELIDA Quiñones CMA

## 2018-08-16 DIAGNOSIS — Z53.9 ERRONEOUS ENCOUNTER--DISREGARD: Primary | ICD-10-CM

## 2018-08-17 DIAGNOSIS — E78.5 HYPERLIPIDEMIA LDL GOAL <130: ICD-10-CM

## 2018-08-17 DIAGNOSIS — I10 HYPERTENSION GOAL BP (BLOOD PRESSURE) < 140/90: Primary | ICD-10-CM

## 2018-08-17 LAB
ALBUMIN SERPL-MCNC: 3.6 G/DL (ref 3.4–5)
ALP SERPL-CCNC: 83 U/L (ref 40–150)
ALT SERPL W P-5'-P-CCNC: 52 U/L (ref 0–70)
ANION GAP SERPL CALCULATED.3IONS-SCNC: 9 MMOL/L (ref 3–14)
AST SERPL W P-5'-P-CCNC: 32 U/L (ref 0–45)
BILIRUB SERPL-MCNC: 0.6 MG/DL (ref 0.2–1.3)
BUN SERPL-MCNC: 9 MG/DL (ref 7–30)
CALCIUM SERPL-MCNC: 8.8 MG/DL (ref 8.5–10.1)
CHLORIDE SERPL-SCNC: 105 MMOL/L (ref 94–109)
CHOLEST SERPL-MCNC: 160 MG/DL
CO2 SERPL-SCNC: 26 MMOL/L (ref 20–32)
CREAT SERPL-MCNC: 0.85 MG/DL (ref 0.66–1.25)
GFR SERPL CREATININE-BSD FRML MDRD: >90 ML/MIN/1.7M2
GLUCOSE SERPL-MCNC: 90 MG/DL (ref 70–99)
HDLC SERPL-MCNC: 25 MG/DL
LDLC SERPL CALC-MCNC: 74 MG/DL
NONHDLC SERPL-MCNC: 135 MG/DL
POTASSIUM SERPL-SCNC: 4.3 MMOL/L (ref 3.4–5.3)
PROT SERPL-MCNC: 7.5 G/DL (ref 6.8–8.8)
SODIUM SERPL-SCNC: 140 MMOL/L (ref 133–144)
TRIGL SERPL-MCNC: 304 MG/DL

## 2018-08-17 PROCEDURE — 80053 COMPREHEN METABOLIC PANEL: CPT | Performed by: FAMILY MEDICINE

## 2018-08-17 PROCEDURE — 80061 LIPID PANEL: CPT | Performed by: FAMILY MEDICINE

## 2018-08-17 PROCEDURE — 36415 COLL VENOUS BLD VENIPUNCTURE: CPT | Performed by: FAMILY MEDICINE

## 2018-08-31 ENCOUNTER — TELEPHONE (OUTPATIENT)
Dept: FAMILY MEDICINE | Facility: CLINIC | Age: 63
End: 2018-08-31

## 2018-08-31 DIAGNOSIS — I10 ESSENTIAL HYPERTENSION WITH GOAL BLOOD PRESSURE LESS THAN 140/90: ICD-10-CM

## 2018-08-31 RX ORDER — METOPROLOL SUCCINATE 100 MG/1
100 TABLET, EXTENDED RELEASE ORAL DAILY
Qty: 30 TABLET | Refills: 0 | Status: SHIPPED | OUTPATIENT
Start: 2018-08-31 | End: 2018-10-02

## 2018-08-31 NOTE — TELEPHONE ENCOUNTER
Reason for Call:  Medication or medication refill:  Do you use a [facility]?  Name of the pharmacy and phone number for the current request:       [facility]    Name of the medication requested: metoprolol succinate (TOPROL-XL) 100 MG 24 hr tablet       Other request: pt is out and would like this sent to [facility]    Can we leave a detailed message on this number? YES    Phone number patient can be reached at: Home number on file [phone] (home)    Best Time: any    Call taken on 8/31/2018 at 3:47 PM by Yulisa Phoenix

## 2018-08-31 NOTE — TELEPHONE ENCOUNTER
Prescription approved per [facility] Refill Protocol.  Pt informed Rx sent.  Kamala GRANDA RN

## 2018-09-04 ENCOUNTER — RADIANT APPOINTMENT (OUTPATIENT)
Dept: GENERAL RADIOLOGY | Facility: CLINIC | Age: 63
End: 2018-09-04
Attending: FAMILY MEDICINE
Payer: COMMERCIAL

## 2018-09-04 ENCOUNTER — OFFICE VISIT (OUTPATIENT)
Dept: FAMILY MEDICINE | Facility: CLINIC | Age: 63
End: 2018-09-04
Payer: COMMERCIAL

## 2018-09-04 DIAGNOSIS — F32.1 MODERATE MAJOR DEPRESSION (H): ICD-10-CM

## 2018-09-04 DIAGNOSIS — M10.072 ACUTE IDIOPATHIC GOUT OF LEFT FOOT: ICD-10-CM

## 2018-09-04 DIAGNOSIS — L21.9 SEBORRHEIC DERMATITIS OF SCALP: ICD-10-CM

## 2018-09-04 DIAGNOSIS — Z00.00 ENCOUNTER FOR ROUTINE ADULT HEALTH EXAMINATION WITHOUT ABNORMAL FINDINGS: Primary | ICD-10-CM

## 2018-09-04 DIAGNOSIS — E78.5 HYPERLIPIDEMIA LDL GOAL <130: ICD-10-CM

## 2018-09-04 DIAGNOSIS — I10 ESSENTIAL HYPERTENSION WITH GOAL BLOOD PRESSURE LESS THAN 140/90: ICD-10-CM

## 2018-09-04 LAB
PSA SERPL-ACNC: 2.76 UG/L (ref 0–4)
URATE SERPL-MCNC: 6.1 MG/DL (ref 3.5–7.2)

## 2018-09-04 PROCEDURE — 99396 PREV VISIT EST AGE 40-64: CPT | Performed by: FAMILY MEDICINE

## 2018-09-04 PROCEDURE — 84550 ASSAY OF BLOOD/URIC ACID: CPT | Performed by: FAMILY MEDICINE

## 2018-09-04 PROCEDURE — 36415 COLL VENOUS BLD VENIPUNCTURE: CPT | Performed by: FAMILY MEDICINE

## 2018-09-04 PROCEDURE — 99213 OFFICE O/P EST LOW 20 MIN: CPT | Mod: 25 | Performed by: FAMILY MEDICINE

## 2018-09-04 PROCEDURE — G0103 PSA SCREENING: HCPCS | Performed by: FAMILY MEDICINE

## 2018-09-04 PROCEDURE — 73630 X-RAY EXAM OF FOOT: CPT | Mod: LT

## 2018-09-04 RX ORDER — KETOCONAZOLE 20 MG/ML
SHAMPOO TOPICAL
Qty: 120 ML | Refills: 1 | Status: SHIPPED | OUTPATIENT
Start: 2018-09-04 | End: 2021-08-10

## 2018-09-04 NOTE — PROGRESS NOTES
SUBJECTIVE:   CC: Darron Schwarz is an 62 year old male who presents for preventative health visit.     Physical   Annual:     Getting at least 3 servings of Calcium per day:  Yes    Bi-annual eye exam:  NO    Dental care twice a year:  Yes    Sleep apnea or symptoms of sleep apnea:  None    Diet:  Regular (no restrictions)    Frequency of exercise:  4-5 days/week    Duration of exercise:  30-45 minutes    Taking medications regularly:  Yes    Medication side effects:  Other    Additional concerns today:  YES         Answers for HPI/ROS submitted by the patient on 9/4/2018   PHQ-2 Score: 0      1)Patient is concern about possible gout on left foot/toe. Had a painful attack a few weeks ago , now it is better , first MTP joint on the left foot, was red hot and painful, then he recovered but walked a lot over last weekend and now some more pian  Not as bad as before   2) HTN , seems well controlled at home , initially was high here but then rechecked went down   3) high lipids- maily triglycerides a nd low HDL but norml LDL       Today's PHQ-2 Score:   PHQ-2 ( 1999 Pfizer) 9/9/2016   Q1: Little interest or pleasure in doing things 0   Q2: Feeling down, depressed or hopeless 0   PHQ-2 Score 0       Abuse: Current or Past(Physical, Sexual or Emotional)- No  Do you feel safe in your environment - Yes    Social History   Substance Use Topics     Smoking status: Never Smoker     Smokeless tobacco: Never Used     Alcohol use 0.0 oz/week     0 Standard drinks or equivalent per week      Comment: 2 glasses of wine per day     No flowsheet data found.No flowsheet data found.    Last PSA:   PSA   Date Value Ref Range Status   05/03/2011 0.72 0 - 4 ug/L Final       Reviewed orders with patient. Reviewed health maintenance and updated orders accordingly - Yes  Labs reviewed in EPIC  BP Readings from Last 3 Encounters:   09/05/18 135/85   01/24/18 (!) 155/100   06/02/17 (!) 130/92    Wt Readings from Last 3 Encounters:    09/04/18 198 lb (89.8 kg)   01/24/18 216 lb 8 oz (98.2 kg)   06/02/17 201 lb 11.2 oz (91.5 kg)                  Patient Active Problem List   Diagnosis     Hypertension goal BP (blood pressure) < 140/90     Hyperlipidemia LDL goal <130     Moderate major depression (H)     Adjustment disorder with anxiety     Essential hypertension with goal blood pressure less than 140/90     Moderate episode of recurrent major depressive disorder (H)     Past Surgical History:   Procedure Laterality Date     C EXCISN COARCT AORTA DRCT ANAST      Hx aortic coaraction repair age 19     HC TOOTH EXTRACTION W/FORCEP  age 21    wisdom teeth       Social History   Substance Use Topics     Smoking status: Never Smoker     Smokeless tobacco: Never Used     Alcohol use 0.0 oz/week     0 Standard drinks or equivalent per week      Comment: 2 glasses of wine per day     Family History   Problem Relation Age of Onset     Psychotic Disorder Mother      Alcohol/Drug Father      Alcohol/Drug Brother      Cardiovascular Sister      Cardiovascular Brother      Hypertension Sister      Hypertension Brother      Alzheimer Disease Sister          Current Outpatient Prescriptions   Medication Sig Dispense Refill     ketoconazole (NIZORAL) 2 % shampoo Apply to the affected area and wash off after 5 minutes. 120 mL 1     lisinopril (PRINIVIL/ZESTRIL) 40 MG tablet Take 1 tablet (40 mg) by mouth daily 90 tablet 1     metoprolol succinate (TOPROL-XL) 100 MG 24 hr tablet Take 1 tablet (100 mg) by mouth daily 30 tablet 0     VITAMIN C 500 MG PO TABS 1 TABLET DAILY       Allergies   Allergen Reactions     Atorvastatin Calcium      malaise     Recent Labs   Lab Test  08/17/18   0905  06/02/17   0948  11/11/16   0931  09/21/16   0824   LDL  74  92   --   108*   HDL  25*  37*   --   46   TRIG  304*  322*   --   131   ALT  52  41  32  29   CR  0.85  0.96  0.82  0.83   GFRESTIMATED  >90  79  >90  Non  GFR Calc    >90  Non  GFR  Calc     GFRESTBLACK  >90  >90  African American GFR Calc    >90   GFR Calc    >90   GFR Calc     POTASSIUM  4.3  4.7  4.9  4.2        Reviewed and updated as needed this visit by clinical staff         Reviewed and updated as needed this visit by Provider        Past Medical History:   Diagnosis Date     Anxiety state, unspecified      Other and unspecified hyperlipidemia      Unspecified essential hypertension      Varicella without mention of complication       Past Surgical History:   Procedure Laterality Date     C EXCISN COARCT AORTA DRCT ANAST      Hx aortic coaraction repair age 19     HC TOOTH EXTRACTION W/FORCEP  age 21    wisdom teeth       Review of Systems  CONSTITUTIONAL: NEGATIVE for fever, chills, change in weight  INTEGUMENTARY/SKIN: NEGATIVE for worrisome rashes, moles or lesions  EYES: NEGATIVE for vision changes or irritation  ENT: NEGATIVE for ear, mouth and throat problems  RESP: NEGATIVE for significant cough or SOB  CV: NEGATIVE for chest pain, palpitations or peripheral edema  GI: NEGATIVE for nausea, abdominal pain, heartburn, or change in bowel habits   male: negative for dysuria, hematuria, decreased urinary stream, erectile dysfunction, urethral discharge  MUSCULOSKELETAL: NEGATIVE for significant arthralgias or myalgia  NEURO: NEGATIVE for weakness, dizziness or paresthesias  PSYCHIATRIC: NEGATIVE for changes in mood or affect    OBJECTIVE:   There were no vitals taken for this visit.    Physical Exam  GENERAL: healthy, alert and no distress  EYES: Eyes grossly normal to inspection, PERRL and conjunctivae and sclerae normal  HENT: ear canals and TM's normal, nose and mouth without ulcers or lesions  NECK: no adenopathy, no asymmetry, masses, or scars and thyroid normal to palpation  RESP: lungs clear to auscultation - no rales, rhonchi or wheezes  CV: regular rate and rhythm, normal S1 S2, no S3 or S4, no murmur, click or rub, no peripheral edema and  peripheral pulses strong  ABDOMEN: soft, nontender, no hepatosplenomegaly, no masses and bowel sounds normal  MS: no gross musculoskeletal defects noted, no edema  SKIN: no suspicious lesions or rashes  NEURO: Normal strength and tone, mentation intact and speech normal  PSYCH: mentation appears normal, affect normal/bright    Diagnostic Test Results:  Results for orders placed or performed in visit on 09/04/18   Uric acid   Result Value Ref Range    Uric Acid 6.1 3.5 - 7.2 mg/dL   PSA, screen   Result Value Ref Range    PSA 2.76 0 - 4 ug/L       ASSESSMENT/PLAN:   1. Encounter for routine adult health examination without abnormal findings  Healthy , will check labs today   - PSA, screen    2. Essential hypertension with goal blood pressure less than 140/90  Seems to be well controlled on lisinopril 40mg and also metoprolol  mg daily dose , when checked at home , here initially it was high but at the end of the visit was down to 135 over 85 , we discussed checking his BP at home and then sending the readings via [employer]     3. Moderate major depression (H)  Is controlled and he is off the SSRIs , used to be on Prozac but was at the time when he broke off with his partner of many yrs , now he is doing fine , no concerns.    4. Hyperlipidemia LDL goal <130  He checked lipids recently and his LDL is good , has low HDL and also some elevation of the triglycerides     5. Acute idiopathic gout of left foot  New for him , will do uric acid level and also X ray done today - no fracture per my reading , some DJD in 1st MTP joint- we discussed using Ibuprofen when the acute exacerbation happens but I have given him a referral for rheumatology as this is first diagnosis and no FH of gout .  - XR Foot Left G/E 3 Views; Future  - Uric acid  - RHEUMATOLOGY REFERRAL    6. Seborrheic dermatitis of scalp  Will try this , if no improvement we discussed derm referral.  - ketoconazole (NIZORAL) 2 % shampoo; Apply to the affected  "area and wash off after 5 minutes.  Dispense: 120 mL; Refill: 1    COUNSELING:   Reviewed preventive health counseling, as reflected in patient instructions       Regular exercise       Healthy diet/nutrition       Vision screening       Hearing screening    BP Readings from Last 1 Encounters:   01/24/18 (!) 155/100     Estimated body mass index is 30.2 kg/(m^2) as calculated from the following:    Height as of 1/24/18: 5' 11\" (1.803 m).    Weight as of 1/24/18: 216 lb 8 oz (98.2 kg).           reports that he has never smoked. He has never used smokeless tobacco.      Counseling Resources:  ATP IV Guidelines  Pooled Cohorts Equation Calculator  FRAX Risk Assessment  ICSI Preventive Guidelines  Dietary Guidelines for Americans, 2010  USDA's MyPlate  ASA Prophylaxis  Lung CA Screening    Eli Sue MD  [facility]  "

## 2018-09-04 NOTE — MR AVS SNAPSHOT
After Visit Summary   9/4/2018    Darron Schwarz    MRN: [id number]           Patient Information     Date Of Birth          1955        Visit Information        Provider Department      9/4/2018 9:00 AM Eli Sue MD [facility]        Today's Diagnoses     Encounter for routine adult health examination without abnormal findings    -  1    Essential hypertension with goal blood pressure less than 140/90        Moderate major depression (H)        Hyperlipidemia LDL goal <130        Acute idiopathic gout of left foot        Seborrheic dermatitis of scalp          Care Instructions      Preventive Health Recommendations  Male Ages 50 - 64    Yearly exam:             See your health care provider every year in order to  o   Review health changes.   o   Discuss preventive care.    o   Review your medicines if your doctor has prescribed any.     Have a cholesterol test every 5 years, or more frequently if you are at risk for high cholesterol/heart disease.     Have a diabetes test (fasting glucose) every three years. If you are at risk for diabetes, you should have this test more often.     Have a colonoscopy at age 50, or have a yearly FIT test (stool test). These exams will check for colon cancer.      Talk with your health care provider about whether or not a prostate cancer screening test (PSA) is right for you.    You should be tested each year for STDs (sexually transmitted diseases), if you re at risk.     Shots: Get a flu shot each year. Get a tetanus shot every 10 years.     Nutrition:    Eat at least 5 servings of fruits and vegetables daily.     Eat whole-grain bread, whole-wheat pasta and brown rice instead of white grains and rice.     Get adequate Calcium and Vitamin D.     Lifestyle    Exercise for at least 150 minutes a week (30 minutes a day, 5 days a week). This will help you control your weight and prevent disease.     Limit alcohol to one drink per day.     No  smoking.     Wear sunscreen to prevent skin cancer.     See your dentist every six months for an exam and cleaning.     See your eye doctor every 1 to 2 years.            Follow-ups after your visit        Additional Services     RHEUMATOLOGY REFERRAL       Your provider has referred you to: [facility]: Rheumatology Clinic - [place] [phone]   [email or web address]  [facility] [phone]   [email or web address]    Please be aware that coverage of these services is subject to the terms and limitations of your health insurance plan.  Call member services at your health plan with any benefit or coverage questions.      Please bring the following with you to your appointment:    (1) Any X-Rays, CTs or MRIs which have been performed.  Contact the facility where they were done to arrange for  prior to your scheduled appointment.    (2) List of current medications   (3) This referral request   (4) Any documents/labs given to you for this referral                  Who to contact     If you have questions or need follow up information about today's clinic visit or your schedule please contact [facility] directly at [phone].  Normal or non-critical lab and imaging results will be communicated to you by MyChart, letter or phone within 4 business days after the clinic has received the results. If you do not hear from us within 7 days, please contact the clinic through "[employer]hart or phone. If you have a critical or abnormal lab result, we will notify you by phone as soon as possible.  Submit refill requests through [employer] or call your pharmacy and they will forward the refill request to us. Please allow 3 business days for your refill to be completed.          Additional Information About Your Visit        "[employer]harb[employer]ombate Information     [employer] gives you secure access to your electronic health record. If you see a primary care  "provider, you can also send messages to your care team and make appointments. If you have questions, please call your primary care clinic.  If you do not have a primary care provider, please call [phone] and they will assist you.        Care EveryWhere ID     This is your Care EveryWhere ID. This could be used by other organizations to access your [place] medical records  [id number]        Your Vitals Were     Pulse Temperature Respirations Height Pulse Oximetry BMI (Body Mass Index)    89 98.1  F (36.7  C) (Oral) 16 5' 11.25\" (1.81 m) 98% 27.42 kg/m2       Blood Pressure from Last 3 Encounters:   09/04/18 (!) 162/97   01/24/18 (!) 155/100   06/02/17 (!) 130/92    Weight from Last 3 Encounters:   09/04/18 198 lb (89.8 kg)   01/24/18 216 lb 8 oz (98.2 kg)   06/02/17 201 lb 11.2 oz (91.5 kg)              We Performed the Following     PSA, screen     RHEUMATOLOGY REFERRAL     Uric acid          Today's Medication Changes          These changes are accurate as of 9/4/18 10:21 AM.  If you have any questions, ask your nurse or doctor.               Start taking these medicines.        Dose/Directions    ketoconazole 2 % shampoo   Commonly known as:  NIZORAL   Used for:  Seborrheic dermatitis of scalp   Started by:  Eli Sue MD        Apply to the affected area and wash off after 5 minutes.   Quantity:  120 mL   Refills:  1            Where to get your medicines      These medications were sent to [facility] [address] [address]     Phone:  [phone]     ketoconazole 2 % shampoo                Primary Care Provider Office Phone # Fax #    Eli Sue [phone] [address] [address]        Equal Access to Services     FEDERICO SESAY AH: Deneen hollando Soharjit, waaxda luqadaha, qaybta kaalmada hattie, ari alan. So wa " [phone].    ATENCIÓN: Si ksenia felipe, tiene a bowser disposición servicios gratuitos de asistencia lingüística. Klarissa guadarrama [phone].    We comply with applicable federal civil rights laws and [place] laws. We do not discriminate on the basis of race, color, national origin, age, disability, sex, sexual orientation, or gender identity.            Thank you!     Thank you for choosing [facility]  for your care. Our goal is always to provide you with excellent care. Hearing back from our patients is one way we can continue to improve our services. Please take a few minutes to complete the written survey that you may receive in the mail after your visit with us. Thank you!             Your Updated Medication List - Protect others around you: Learn how to safely use, store and throw away your medicines at www.disposemymeds.org.          This list is accurate as of 9/4/18 10:21 AM.  Always use your most recent med list.                   Brand Name Dispense Instructions for use Diagnosis    ascorbic acid 500 MG tablet    VITAMIN C     1 TABLET DAILY        ketoconazole 2 % shampoo    NIZORAL    120 mL    Apply to the affected area and wash off after 5 minutes.    Seborrheic dermatitis of scalp       lisinopril 40 MG tablet    PRINIVIL/ZESTRIL    90 tablet    Take 1 tablet (40 mg) by mouth daily    Essential hypertension with goal blood pressure less than 140/90       metoprolol succinate 100 MG 24 hr tablet    TOPROL-XL    30 tablet    Take 1 tablet (100 mg) by mouth daily    Essential hypertension with goal blood pressure less than 140/90

## 2018-09-05 VITALS
BODY MASS INDEX: 27.72 KG/M2 | DIASTOLIC BLOOD PRESSURE: 85 MMHG | SYSTOLIC BLOOD PRESSURE: 135 MMHG | WEIGHT: 198 LBS | OXYGEN SATURATION: 98 % | HEART RATE: 89 BPM | TEMPERATURE: 98.1 F | HEIGHT: 71 IN | RESPIRATION RATE: 16 BRPM

## 2018-09-06 ASSESSMENT — PATIENT HEALTH QUESTIONNAIRE - PHQ9: SUM OF ALL RESPONSES TO PHQ QUESTIONS 1-9: 0

## 2018-10-02 ENCOUNTER — TELEPHONE (OUTPATIENT)
Dept: FAMILY MEDICINE | Facility: CLINIC | Age: 63
End: 2018-10-02

## 2018-10-02 DIAGNOSIS — I10 ESSENTIAL HYPERTENSION WITH GOAL BLOOD PRESSURE LESS THAN 140/90: ICD-10-CM

## 2018-10-02 RX ORDER — METOPROLOL SUCCINATE 100 MG/1
100 TABLET, EXTENDED RELEASE ORAL DAILY
Qty: 90 TABLET | Refills: 1 | Status: SHIPPED | OUTPATIENT
Start: 2018-10-02 | End: 2019-04-10

## 2018-10-02 RX ORDER — LISINOPRIL 40 MG/1
40 TABLET ORAL DAILY
Qty: 90 TABLET | Refills: 1 | Status: SHIPPED | OUTPATIENT
Start: 2018-10-02 | End: 2019-04-10

## 2018-10-02 NOTE — TELEPHONE ENCOUNTER
Requested Prescriptions   Pending Prescriptions Disp Refills     lisinopril (PRINIVIL/ZESTRIL) 40 MG tablet 90 tablet 1     Sig: Take 1 tablet (40 mg) by mouth daily    There is no refill protocol information for this order        metoprolol succinate (TOPROL-XL) 100 MG 24 hr tablet 90 tablet 1     Sig: Take 1 tablet (100 mg) by mouth daily    There is no refill protocol information for this order          Sent to pharmacy pt just had physical and fasting labs.

## 2018-10-02 NOTE — TELEPHONE ENCOUNTER
Reason for Call:  Medication or medication refill:    Do you use a [facility]?  Name of the pharmacy and phone number for the current request:      [facility] #  [phone]      Name of the medication requested: Metoprolol & Lisinopril     Other request: None    Can we leave a detailed message on this number? YES    Phone number patient can be reached at: Home number on file [phone] (home)    Best Time: anytime    Call taken on 10/2/2018 at 1:56 PM by Scott Contreras

## 2018-10-03 RX ORDER — METOPROLOL SUCCINATE 100 MG/1
TABLET, EXTENDED RELEASE ORAL
Start: 2018-10-03

## 2018-10-03 RX ORDER — LISINOPRIL 40 MG/1
TABLET ORAL
Start: 2018-10-03

## 2018-10-03 NOTE — TELEPHONE ENCOUNTER
"Duplicate.  Rx sent for both yesterday.  Sarah Randolph RN    Requested Prescriptions   Refused Prescriptions Disp Refills     lisinopril (PRINIVIL/ZESTRIL) 40 MG tablet [Pharmacy Med Name: LISINOPRIL TABS 40MG]       Sig: TAKE 1 TABLET DAILY    ACE Inhibitors (Including Combos) Protocol Passed    10/2/2018  2:02 PM       Passed - Blood pressure under 140/90 in past 12 months    BP Readings from Last 3 Encounters:   09/05/18 135/85   01/24/18 (!) 155/100   06/02/17 (!) 130/92                Passed - Recent (12 mo) or future (30 days) visit within the authorizing provider's specialty    Patient had office visit in the last 12 months or has a visit in the next 30 days with authorizing provider or within the authorizing provider's specialty.  See \"Patient Info\" tab in inbasket, or \"Choose Columns\" in Meds & Orders section of the refill encounter.           Passed - Patient is age 18 or older       Passed - Normal serum creatinine on file in past 12 months    Recent Labs   Lab Test  08/17/18   0905   CR  0.85            Passed - Normal serum potassium on file in past 12 months    Recent Labs   Lab Test  08/17/18   0905   POTASSIUM  4.3             metoprolol succinate (TOPROL-XL) 100 MG 24 hr tablet [Pharmacy Med Name: METOPROLOL SUCC ER TAB 100MG]       Sig: TAKE 1 TABLET DAILY    Beta-Blockers Protocol Passed    10/2/2018  2:02 PM       Passed - Blood pressure under 140/90 in past 12 months    BP Readings from Last 3 Encounters:   09/05/18 135/85   01/24/18 (!) 155/100   06/02/17 (!) 130/92                Passed - Patient is age 6 or older       Passed - Recent (12 mo) or future (30 days) visit within the authorizing provider's specialty    Patient had office visit in the last 12 months or has a visit in the next 30 days with authorizing provider or within the authorizing provider's specialty.  See \"Patient Info\" tab in inbasket, or \"Choose Columns\" in Meds & Orders section of the refill encounter.              "

## 2019-04-10 DIAGNOSIS — I10 ESSENTIAL HYPERTENSION WITH GOAL BLOOD PRESSURE LESS THAN 140/90: ICD-10-CM

## 2019-04-11 RX ORDER — LISINOPRIL 40 MG/1
TABLET ORAL
Qty: 90 TABLET | Refills: 1 | Status: SHIPPED | OUTPATIENT
Start: 2019-04-11 | End: 2019-10-25

## 2019-04-11 RX ORDER — METOPROLOL SUCCINATE 100 MG/1
TABLET, EXTENDED RELEASE ORAL
Qty: 90 TABLET | Refills: 1 | Status: SHIPPED | OUTPATIENT
Start: 2019-04-11 | End: 2019-10-25

## 2019-04-11 NOTE — TELEPHONE ENCOUNTER
"Prescription approved per [facility] Refill Protocol.  Kamala GRANDA RN    Requested Prescriptions   Pending Prescriptions Disp Refills     lisinopril (PRINIVIL/ZESTRIL) 40 MG tablet [Pharmacy Med Name: LISINOPRIL 40MG TABLETS] 90 tablet 0     Sig: TAKE 1 TABLET(40 MG) BY MOUTH DAILY       ACE Inhibitors (Including Combos) Protocol Passed - 4/10/2019  2:45 PM        Passed - Blood pressure under 140/90 in past 12 months     BP Readings from Last 3 Encounters:   09/05/18 135/85   01/24/18 (!) 155/100   06/02/17 (!) 130/92                 Passed - Recent (12 mo) or future (30 days) visit within the authorizing provider's specialty     Patient had office visit in the last 12 months or has a visit in the next 30 days with authorizing provider or within the authorizing provider's specialty.  See \"Patient Info\" tab in inbasket, or \"Choose Columns\" in Meds & Orders section of the refill encounter.              Passed - Medication is active on med list        Passed - Patient is age 18 or older        Passed - Normal serum creatinine on file in past 12 months     Recent Labs   Lab Test 08/17/18  0905   CR 0.85             Passed - Normal serum potassium on file in past 12 months     Recent Labs   Lab Test 08/17/18  0905   POTASSIUM 4.3             metoprolol succinate ER (TOPROL-XL) 100 MG 24 hr tablet [Pharmacy Med Name: METOPROLOL ER SUCCINATE 100MG TABS] 90 tablet 0     Sig: TAKE 1 TABLET(100 MG) BY MOUTH DAILY       Beta-Blockers Protocol Passed - 4/10/2019  2:45 PM        Passed - Blood pressure under 140/90 in past 12 months     BP Readings from Last 3 Encounters:   09/05/18 135/85   01/24/18 (!) 155/100   06/02/17 (!) 130/92                 Passed - Patient is age 6 or older        Passed - Recent (12 mo) or future (30 days) visit within the authorizing provider's specialty     Patient had office visit in the last 12 months or has a visit in the next 30 days with authorizing provider or within the authorizing provider's " "specialty.  See \"Patient Info\" tab in inbasket, or \"Choose Columns\" in Meds & Orders section of the refill encounter.              Passed - Medication is active on med list            "

## 2019-10-25 DIAGNOSIS — I10 ESSENTIAL HYPERTENSION WITH GOAL BLOOD PRESSURE LESS THAN 140/90: ICD-10-CM

## 2019-10-28 RX ORDER — METOPROLOL SUCCINATE 100 MG/1
TABLET, EXTENDED RELEASE ORAL
Qty: 30 TABLET | Refills: 0 | Status: SHIPPED | OUTPATIENT
Start: 2019-10-28 | End: 2019-11-22

## 2019-10-28 RX ORDER — LISINOPRIL 40 MG/1
TABLET ORAL
Qty: 30 TABLET | Refills: 0 | Status: SHIPPED | OUTPATIENT
Start: 2019-10-28 | End: 2019-11-22

## 2019-10-28 NOTE — TELEPHONE ENCOUNTER
"Medication is being filled for 1 time refill only due to:  Patient needs to be seen because it has been more than one year since last visit.   Due for physical.  Last seen 9/4/18  Sarah Randolph RN    Requested Prescriptions   Pending Prescriptions Disp Refills     lisinopril (PRINIVIL/ZESTRIL) 40 MG tablet [Pharmacy Med Name: LISINOPRIL 40MG TABLETS] 90 tablet 0     Sig: TAKE 1 TABLET(40 MG) BY MOUTH DAILY       ACE Inhibitors (Including Combos) Protocol Failed - 10/25/2019  8:31 PM        Failed - Blood pressure under 140/90 in past 12 months     BP Readings from Last 3 Encounters:   09/05/18 135/85   01/24/18 (!) 155/100   06/02/17 (!) 130/92                 Failed - Recent (12 mo) or future (30 days) visit within the authorizing provider's specialty     Patient has had an office visit with the authorizing provider or a provider within the authorizing providers department within the previous 12 mos or has a future within next 30 days. See \"Patient Info\" tab in inbasket, or \"Choose Columns\" in Meds & Orders section of the refill encounter.              Failed - Normal serum creatinine on file in past 12 months     Recent Labs   Lab Test 08/17/18  0905   CR 0.85             Failed - Normal serum potassium on file in past 12 months     Recent Labs   Lab Test 08/17/18  0905   POTASSIUM 4.3             Passed - Medication is active on med list        Passed - Patient is age 18 or older        metoprolol succinate ER (TOPROL-XL) 100 MG 24 hr tablet [Pharmacy Med Name: METOPROLOL ER SUCCINATE 100MG TABS] 90 tablet 0     Sig: TAKE 1 TABLET(100 MG) BY MOUTH DAILY       Beta-Blockers Protocol Failed - 10/25/2019  8:31 PM        Failed - Blood pressure under 140/90 in past 12 months     BP Readings from Last 3 Encounters:   09/05/18 135/85   01/24/18 (!) 155/100   06/02/17 (!) 130/92                 Failed - Recent (12 mo) or future (30 days) visit within the authorizing provider's specialty     Patient has had an office " "visit with the authorizing provider or a provider within the authorizing providers department within the previous 12 mos or has a future within next 30 days. See \"Patient Info\" tab in inbasket, or \"Choose Columns\" in Meds & Orders section of the refill encounter.              Passed - Patient is age 6 or older        Passed - Medication is active on med list        "

## 2019-11-03 ENCOUNTER — HEALTH MAINTENANCE LETTER (OUTPATIENT)
Age: 64
End: 2019-11-03

## 2019-11-22 DIAGNOSIS — I10 ESSENTIAL HYPERTENSION WITH GOAL BLOOD PRESSURE LESS THAN 140/90: ICD-10-CM

## 2019-11-22 NOTE — TELEPHONE ENCOUNTER
"Requested Prescriptions   Pending Prescriptions Disp Refills     metoprolol succinate ER (TOPROL-XL) 100 MG 24 hr tablet [Pharmacy Med Name: METOPROLOL ER SUCCINATE 100MG TABS]  Last Written Prescription Date:  10/28/2019  Last Fill Quantity: 30 tab,  # refills: 0   Last Office Visit: 9/4/2018 [facility]  Future Office Visit:      30 tablet 0     Sig: TAKE 1 TABLET BY MOUTH DAILY       Beta-Blockers Protocol Failed - 11/22/2019  3:35 PM        Failed - Blood pressure under 140/90 in past 12 months     BP Readings from Last 3 Encounters:   09/05/18 135/85   01/24/18 (!) 155/100   06/02/17 (!) 130/92                 Failed - Recent (12 mo) or future (30 days) visit within the authorizing provider's specialty     Patient has had an office visit with the authorizing provider or a provider within the authorizing providers department within the previous 12 mos or has a future within next 30 days. See \"Patient Info\" tab in inbasket, or \"Choose Columns\" in Meds & Orders section of the refill encounter.              Passed - Patient is age 6 or older        Passed - Medication is active on med list                  lisinopril (PRINIVIL/ZESTRIL) 40 MG tablet [Pharmacy Med Name: LISINOPRIL 40MG TABLETS]  Last Written Prescription Date:  10/28/2019  Last Fill Quantity: 30 tab,  # refills: 0   Last Office Visit: 9/4/2018 [facility]  Future Office Visit:      30 tablet 0     Sig: TAKE 1 TABLET BY MOUTH DAILY       ACE Inhibitors (Including Combos) Protocol Failed - 11/22/2019  3:35 PM        Failed - Blood pressure under 140/90 in past 12 months     BP Readings from Last 3 Encounters:   09/05/18 135/85   01/24/18 (!) 155/100   06/02/17 (!) 130/92                 Failed - Recent (12 mo) or future (30 days) visit within the authorizing provider's specialty     Patient has had an office visit with the authorizing provider or a provider within the authorizing providers department within the previous 12 mos or has a future within next 30 " "days. See \"Patient Info\" tab in inbasket, or \"Choose Columns\" in Meds & Orders section of the refill encounter.              Failed - Normal serum creatinine on file in past 12 months     Recent Labs   Lab Test 08/17/18 0905   CR 0.85             Failed - Normal serum potassium on file in past 12 months     Recent Labs   Lab Test 08/17/18 0905   POTASSIUM 4.3             Passed - Medication is active on med list        Passed - Patient is age 18 or older        "

## 2019-12-03 NOTE — TELEPHONE ENCOUNTER
LS,  Tried to call pt - VM full   See below messages  No response from patient to our outreach  He never read [employer]  Please advise on further refill  Thanks,  Kamala GRANDA RN

## 2019-12-04 RX ORDER — LISINOPRIL 40 MG/1
TABLET ORAL
Qty: 30 TABLET | Refills: 0 | Status: SHIPPED | OUTPATIENT
Start: 2019-12-04 | End: 2020-01-02

## 2019-12-04 RX ORDER — METOPROLOL SUCCINATE 100 MG/1
TABLET, EXTENDED RELEASE ORAL
Qty: 30 TABLET | Refills: 0 | Status: SHIPPED | OUTPATIENT
Start: 2019-12-04 | End: 2020-01-02

## 2020-01-01 DIAGNOSIS — I10 ESSENTIAL HYPERTENSION WITH GOAL BLOOD PRESSURE LESS THAN 140/90: ICD-10-CM

## 2020-01-01 NOTE — LETTER
January 2, 2020      Darron Schwarz  [address] [facility] [address]            Dear Darron,    We attempted to call you but your mailbox is full and could not leave a message. We are concerned about your health care.  We recently provided you with medication refills but it has been over a year since you were last seen and many medications require routine follow-up with your doctor.    Your prescription(s) have been refilled for a 30 day supply so you may have time for the above noted follow-up. Please call to schedule soon so we can assure you have an appointment before your next refills are needed.        Sincerely,    Eli Sue MD

## 2020-01-02 RX ORDER — LISINOPRIL 40 MG/1
TABLET ORAL
Qty: 30 TABLET | Refills: 0 | Status: SHIPPED | OUTPATIENT
Start: 2020-01-02 | End: 2020-02-07

## 2020-01-02 RX ORDER — METOPROLOL SUCCINATE 100 MG/1
TABLET, EXTENDED RELEASE ORAL
Qty: 30 TABLET | Refills: 0 | Status: SHIPPED | OUTPATIENT
Start: 2020-01-02 | End: 2020-02-07

## 2020-01-02 NOTE — TELEPHONE ENCOUNTER
Medication is being filled for 1 time refill only due to:  Patient needs to be seen because it has been more than one year since last visit.      Called pt but no answer and could not leave VM since mailbox is full. Sent letter overdue for visit and was given a 30 day supply of meds.    Elana Mehta RN

## 2020-02-03 DIAGNOSIS — I10 ESSENTIAL HYPERTENSION WITH GOAL BLOOD PRESSURE LESS THAN 140/90: ICD-10-CM

## 2020-02-03 NOTE — TELEPHONE ENCOUNTER
"Due for physical   Sent [employer]hart to pt  Kamala GRANDA RN    Last Written Prescription Date:  1/2/2020  Last Fill Quantity: 30,  # refills: 0   Last office visit: 9/4/2018 with prescribing provider:     Future Office Visit:    Requested Prescriptions   Pending Prescriptions Disp Refills     metoprolol succinate ER (TOPROL-XL) 100 MG 24 hr tablet [Pharmacy Med Name: METOPROLOL ER SUCCINATE 100MG TABS] 30 tablet 0     Sig: TAKE 1 TABLET BY MOUTH DAILY       Beta-Blockers Protocol Failed - 2/3/2020  3:25 AM        Failed - Blood pressure under 140/90 in past 12 months     BP Readings from Last 3 Encounters:   09/05/18 135/85   01/24/18 (!) 155/100   06/02/17 (!) 130/92                 Failed - Recent (12 mo) or future (30 days) visit within the authorizing provider's specialty     Patient has had an office visit with the authorizing provider or a provider within the authorizing providers department within the previous 12 mos or has a future within next 30 days. See \"Patient Info\" tab in inbasket, or \"Choose Columns\" in Meds & Orders section of the refill encounter.              Passed - Patient is age 6 or older        Passed - Medication is active on med list        lisinopril (PRINIVIL/ZESTRIL) 40 MG tablet [Pharmacy Med Name: LISINOPRIL 40MG TABLETS] 30 tablet 0     Sig: TAKE 1 TABLET BY MOUTH DAILY       ACE Inhibitors (Including Combos) Protocol Failed - 2/3/2020  3:25 AM        Failed - Blood pressure under 140/90 in past 12 months     BP Readings from Last 3 Encounters:   09/05/18 135/85   01/24/18 (!) 155/100   06/02/17 (!) 130/92                 Failed - Recent (12 mo) or future (30 days) visit within the authorizing provider's specialty     Patient has had an office visit with the authorizing provider or a provider within the authorizing providers department within the previous 12 mos or has a future within next 30 days. See \"Patient Info\" tab in inbasket, or \"Choose Columns\" in Meds & Orders section of the refill " encounter.              Failed - Normal serum creatinine on file in past 12 months     Recent Labs   Lab Test 08/17/18 0905   CR 0.85             Failed - Normal serum potassium on file in past 12 months     Recent Labs   Lab Test 08/17/18 0905   POTASSIUM 4.3             Passed - Medication is active on med list        Passed - Patient is age 18 or older

## 2020-02-06 NOTE — TELEPHONE ENCOUNTER
Patient has not read [employer]  Left non detailed VM for pt asking that they callback and schedule physical   Kamala GRANDA RN

## 2020-02-07 RX ORDER — LISINOPRIL 40 MG/1
TABLET ORAL
Qty: 30 TABLET | Refills: 0 | Status: SHIPPED | OUTPATIENT
Start: 2020-02-07 | End: 2020-04-06

## 2020-02-07 RX ORDER — METOPROLOL SUCCINATE 100 MG/1
TABLET, EXTENDED RELEASE ORAL
Qty: 30 TABLET | Refills: 0 | Status: SHIPPED | OUTPATIENT
Start: 2020-02-07 | End: 2020-04-08

## 2020-02-07 NOTE — TELEPHONE ENCOUNTER
LS,  Tried to call pt for 2nd time  VM now full   See below messages  No response from patient to our outreach  Please advise on further refill  Thanks,  Kamala GRANDA RN

## 2020-04-04 DIAGNOSIS — I10 ESSENTIAL HYPERTENSION WITH GOAL BLOOD PRESSURE LESS THAN 140/90: ICD-10-CM

## 2020-04-04 RX ORDER — METOPROLOL SUCCINATE 100 MG/1
TABLET, EXTENDED RELEASE ORAL
Qty: 30 TABLET | Refills: 0 | Status: CANCELLED | OUTPATIENT
Start: 2020-04-04

## 2020-04-06 DIAGNOSIS — I10 ESSENTIAL HYPERTENSION WITH GOAL BLOOD PRESSURE LESS THAN 140/90: ICD-10-CM

## 2020-04-06 NOTE — TELEPHONE ENCOUNTER
"Metoprolol  Last Written Prescription Date:  02/07/2020  Last Fill Quantity: 30,  # refills: 0   Last office visit: 9/4/2018 with prescribing provider:  yes   Future Office Visit:    Requested Prescriptions   Pending Prescriptions Disp Refills     metoprolol succinate ER (TOPROL-XL) 100 MG 24 hr tablet [Pharmacy Med Name: METOPROLOL ER SUCCINATE 100MG TABS] 30 tablet 0     Sig: TAKE 1 TABLET(100 MG) BY MOUTH DAILY       Beta-Blockers Protocol Failed - 4/4/2020 12:22 PM        Failed - Blood pressure under 140/90 in past 12 months     BP Readings from Last 3 Encounters:   09/05/18 135/85   01/24/18 (!) 155/100   06/02/17 (!) 130/92                 Failed - Recent (12 mo) or future (30 days) visit within the authorizing provider's specialty     Patient has had an office visit with the authorizing provider or a provider within the authorizing providers department within the previous 12 mos or has a future within next 30 days. See \"Patient Info\" tab in inbasket, or \"Choose Columns\" in Meds & Orders section of the refill encounter.              Passed - Patient is age 6 or older        Passed - Medication is active on med list           "

## 2020-04-06 NOTE — TELEPHONE ENCOUNTER
"lisinopril (ZESTRIL) 40 MG tablet   Last Written Prescription Date:  02/07/2020  Last Fill Quantity: 30,  # refills: 0   Last office visit: 9/4/2018 with prescribing provider:  DEYSI   Future Office Visit:  Nothing at this time scheduled.    Requested Prescriptions   Pending Prescriptions Disp Refills     lisinopril (ZESTRIL) 40 MG tablet 30 tablet 0     Sig: Take 1 tablet (40 mg) by mouth daily       ACE Inhibitors (Including Combos) Protocol Failed - 4/6/2020 12:58 PM        Failed - Blood pressure under 140/90 in past 12 months     BP Readings from Last 3 Encounters:   09/05/18 135/85   01/24/18 (!) 155/100   06/02/17 (!) 130/92                 Failed - Recent (12 mo) or future (30 days) visit within the authorizing provider's specialty     Patient has had an office visit with the authorizing provider or a provider within the authorizing providers department within the previous 12 mos or has a future within next 30 days. See \"Patient Info\" tab in inbasket, or \"Choose Columns\" in Meds & Orders section of the refill encounter.              Failed - Normal serum creatinine on file in past 12 months     Recent Labs   Lab Test 08/17/18  0905   CR 0.85       Ok to refill medication if creatinine is low          Failed - Normal serum potassium on file in past 12 months     Recent Labs   Lab Test 08/17/18  0905   POTASSIUM 4.3             Passed - Medication is active on med list        Passed - Patient is age 18 or older           "

## 2020-04-08 NOTE — TELEPHONE ENCOUNTER
Patient has not read No[employer] message.  Last saw LS 9/4/18    VM left asking patient to call clinic.  Needs telephone visit with Dr. Sue.  Sarah Randolph RN

## 2020-04-09 RX ORDER — METOPROLOL SUCCINATE 100 MG/1
TABLET, EXTENDED RELEASE ORAL
Qty: 7 TABLET | Refills: 0 | Status: SHIPPED | OUTPATIENT
Start: 2020-04-09 | End: 2020-04-10

## 2020-04-09 RX ORDER — LISINOPRIL 40 MG/1
40 TABLET ORAL DAILY
Qty: 7 TABLET | Refills: 0 | Status: SHIPPED | OUTPATIENT
Start: 2020-04-09 | End: 2020-04-10

## 2020-04-09 NOTE — TELEPHONE ENCOUNTER
Next 5 appointments (look out 90 days)    Apr 10, 2020 10:00 AM CDT  Telephone Visit with Eli Sue MD  [facility] ([facility]) [address] [address]  [phone]        Prescription approved per [facility] Refill Protocol.  Kamala GRANDA RN

## 2020-04-10 ENCOUNTER — VIRTUAL VISIT (OUTPATIENT)
Dept: FAMILY MEDICINE | Facility: CLINIC | Age: 65
End: 2020-04-10
Payer: COMMERCIAL

## 2020-04-10 DIAGNOSIS — E78.5 HYPERLIPIDEMIA LDL GOAL <130: ICD-10-CM

## 2020-04-10 DIAGNOSIS — F32.1 MODERATE MAJOR DEPRESSION (H): ICD-10-CM

## 2020-04-10 DIAGNOSIS — I10 ESSENTIAL HYPERTENSION WITH GOAL BLOOD PRESSURE LESS THAN 140/90: Primary | ICD-10-CM

## 2020-04-10 PROCEDURE — 99214 OFFICE O/P EST MOD 30 MIN: CPT | Mod: 95 | Performed by: FAMILY MEDICINE

## 2020-04-10 RX ORDER — METOPROLOL SUCCINATE 100 MG/1
100 TABLET, EXTENDED RELEASE ORAL DAILY
Qty: 30 TABLET | Refills: 5 | Status: SHIPPED | OUTPATIENT
Start: 2020-04-10 | End: 2020-10-21

## 2020-04-10 RX ORDER — LISINOPRIL 40 MG/1
40 TABLET ORAL DAILY
Qty: 30 TABLET | Refills: 5 | Status: SHIPPED | OUTPATIENT
Start: 2020-04-10 | End: 2020-10-21

## 2020-04-10 NOTE — PROGRESS NOTES
"Subjective     Darron Schwarz is a 64 year old male who is being evaluated via a billable telephone visit.      The patient has been notified of following:     \"This telephone visit will be conducted via a call between you and your physician/provider. We have found that certain health care needs can be provided without the need for a physical exam.  This service lets us provide the care you need with a short phone conversation.  If a prescription is necessary we can send it directly to your pharmacy.  If lab work is needed we can place an order for that and you can then stop by our lab to have the test done at a later time.    Telephone visits are billed at different rates depending on your insurance coverage. During this emergency period, for some insurers they may be billed the same as an in-person visit.  Please reach out to your insurance provider with any questions.    If during the course of the call the physician/provider feels a telephone visit is not appropriate, you will not be charged for this service.\"    Patient has given verbal consent for Telephone visit?  Yes    How would you like to obtain your AVS? MyChart    Darron Schwarz complains of   Chief Complaint   Patient presents with     Recheck Medication       ALLERGIES  Atorvastatin calcium    Medication Followup of lisinopril/metoprolol    Taking Medication as prescribed: yes    Side Effects:  None, sometimes makes me tired     Medication Helping Symptoms:  yes              Patient Active Problem List   Diagnosis     Hypertension goal BP (blood pressure) < 140/90     Hyperlipidemia LDL goal <130     Moderate major depression (H)     Adjustment disorder with anxiety     Essential hypertension with goal blood pressure less than 140/90     Moderate episode of recurrent major depressive disorder (H)     Past Surgical History:   Procedure Laterality Date     C EXCISN COARCT AORTA DRCT ANAST      Hx aortic coaraction repair age 19     HC TOOTH " EXTRACTION W/FORCEP  age 21    wisdom teeth       Social History     Tobacco Use     Smoking status: Never Smoker     Smokeless tobacco: Never Used   Substance Use Topics     Alcohol use: Yes     Alcohol/week: 0.0 standard drinks     Comment: 2 glasses of wine per day     Family History   Problem Relation Age of Onset     Psychotic Disorder Mother      Alcohol/Drug Father      Alcohol/Drug Brother      Cardiovascular Sister      Cardiovascular Brother      Hypertension Sister      Hypertension Brother      Alzheimer Disease Sister          Current Outpatient Medications   Medication Sig Dispense Refill     ketoconazole (NIZORAL) 2 % shampoo Apply to the affected area and wash off after 5 minutes. 120 mL 1     lisinopril (ZESTRIL) 40 MG tablet Take 1 tablet (40 mg) by mouth daily 30 tablet 5     metoprolol succinate ER (TOPROL-XL) 100 MG 24 hr tablet Take 1 tablet (100 mg) by mouth daily 30 tablet 5     VITAMIN C 500 MG PO TABS 1 TABLET DAILY       Allergies   Allergen Reactions     Atorvastatin Calcium      malaise     Recent Labs   Lab Test 08/17/18  0905 06/02/17  0948 11/11/16  0931 09/21/16  0824   LDL 74 92  --  108*   HDL 25* 37*  --  46   TRIG 304* 322*  --  131   ALT 52 41 32 29   CR 0.85 0.96 0.82 0.83   GFRESTIMATED >90 79 >90  Non  GFR Calc   >90  Non  GFR Calc     GFRESTBLACK >90 >90   GFR Calc   >90   GFR Calc   >90   GFR Calc     POTASSIUM 4.3 4.7 4.9 4.2      BP Readings from Last 3 Encounters:   09/05/18 135/85   01/24/18 (!) 155/100   06/02/17 (!) 130/92    Wt Readings from Last 3 Encounters:   09/04/18 89.8 kg (198 lb)   01/24/18 98.2 kg (216 lb 8 oz)   06/02/17 91.5 kg (201 lb 11.2 oz)                    Reviewed and updated as needed this visit by Provider         Review of Systems   ROS COMP: Constitutional, HEENT, cardiovascular, pulmonary, GI, , musculoskeletal, neuro, skin, endocrine and psych systems are  negative, except as otherwise noted.       Objective   Reported vitals:  There were no vitals taken for this visit.   healthy, alert and no distress  Psych: Alert and oriented times 3; coherent speech, normal   rate and volume, able to articulate logical thoughts, able   to abstract reason, no tangential thoughts, no hallucinations   or delusions  His affect is normal      Diagnostic Test Results:  Labs reviewed in Epic  none         Assessment/Plan:  1. Essential hypertension with goal blood pressure less than 140/90  He is doing well on the current BP meds and I have refilled his Rx , we discussed coming for labs in the near future , fasting   - lisinopril (ZESTRIL) 40 MG tablet; Take 1 tablet (40 mg) by mouth daily  Dispense: 30 tablet; Refill: 5  - metoprolol succinate ER (TOPROL-XL) 100 MG 24 hr tablet; Take 1 tablet (100 mg) by mouth daily  Dispense: 30 tablet; Refill: 5  - Lipid Profile (Chol, Trig, HDL, LDL calc); Future  - Comprehensive metabolic panel (BMP + Alb, Alk Phos, ALT, AST, Total. Bili,   TP);future ,     (F32.1) Moderate major depression (H)  Comment: he states that he is doing wel currently although he is not on any antidepressants  , has been doing well, since he has worked from home for a while and the Covid 19 situation has not disrupted his work much so far   Plan: does have some anxiety but not excessive and is able to cope with exercise , meditation etc .    (E78.5) Hyperlipidemia LDL goal <130  Comment: we went over his most recent lipids from 2018 , he needs to do fasting labs again and I have placed the orders   Plan: he has been off the Tricor for three yrs or so and seems that the triglycerides were getting high again in 2018 but pt prefers not to start again until we check again , would defer a f/u on this after he gets his labs done   Pt is aware  and comfortable with the current plan.  RTC if no improving or worsening.              Phone call duration:  19 minutes    Eli Sue  MD

## 2020-11-16 ENCOUNTER — HEALTH MAINTENANCE LETTER (OUTPATIENT)
Age: 65
End: 2020-11-16

## 2020-11-19 DIAGNOSIS — I10 ESSENTIAL HYPERTENSION WITH GOAL BLOOD PRESSURE LESS THAN 140/90: ICD-10-CM

## 2020-11-19 NOTE — TELEPHONE ENCOUNTER
"metoprolol  Last Written Prescription Date:  10/21/2020  Last Fill Quantity: 30,  # refills: 0   Last office visit: Visit date not found with prescribing provider:  yes   Future Office Visit:      Lisinopril  Last Written Prescription Date:  10/21/2020  Last Fill Quantity: 30,  # refills: 0   Last office visit: Visit date not found with prescribing provider:  yes   Future Office Visit:        Requested Prescriptions   Pending Prescriptions Disp Refills     metoprolol succinate ER (TOPROL-XL) 100 MG 24 hr tablet 30 tablet 0     Sig: Take 1 tablet (100 mg) by mouth daily       Beta-Blockers Protocol Failed - 11/19/2020  2:53 PM        Failed - Blood pressure under 140/90 in past 12 months     BP Readings from Last 3 Encounters:   09/05/18 135/85   01/24/18 (!) 155/100   06/02/17 (!) 130/92                 Passed - Patient is age 6 or older        Passed - Recent (12 mo) or future (30 days) visit within the authorizing provider's specialty     Patient has had an office visit with the authorizing provider or a provider within the authorizing providers department within the previous 12 mos or has a future within next 30 days. See \"Patient Info\" tab in inbasket, or \"Choose Columns\" in Meds & Orders section of the refill encounter.              Passed - Medication is active on med list           lisinopril (ZESTRIL) 40 MG tablet 30 tablet 0     Sig: Take 1 tablet (40 mg) by mouth daily       ACE Inhibitors (Including Combos) Protocol Failed - 11/19/2020  2:53 PM        Failed - Blood pressure under 140/90 in past 12 months     BP Readings from Last 3 Encounters:   09/05/18 135/85   01/24/18 (!) 155/100   06/02/17 (!) 130/92                 Failed - Normal serum creatinine on file in past 12 months     Recent Labs   Lab Test 08/17/18  0905   CR 0.85       Ok to refill medication if creatinine is low          Failed - Normal serum potassium on file in past 12 months     Recent Labs   Lab Test 08/17/18  0905   POTASSIUM 4.3 " "            Passed - Recent (12 mo) or future (30 days) visit within the authorizing provider's specialty     Patient has had an office visit with the authorizing provider or a provider within the authorizing providers department within the previous 12 mos or has a future within next 30 days. See \"Patient Info\" tab in inbasket, or \"Choose Columns\" in Meds & Orders section of the refill encounter.              Passed - Medication is active on med list        Passed - Patient is age 18 or older                         "

## 2020-11-20 NOTE — TELEPHONE ENCOUNTER
One month supply for each sent 10/21.    Due for follow up  Note from 4/10/2020 virtual visit:   Return in about 3 months (around 7/10/2020) for follow up visit, Lab Work.    [employer] message sent to patient asking him to schedule appointment      Sarah Randolph RN

## 2020-11-25 NOTE — TELEPHONE ENCOUNTER
LS,  Tried to call pt - VM full   See below messages  No response from patient to our outreach  Patient has not read MyChart  Please advise on further refill  Thanks,  Kamala GRANDA RN

## 2020-11-27 RX ORDER — LISINOPRIL 40 MG/1
40 TABLET ORAL DAILY
Qty: 30 TABLET | Refills: 1 | Status: SHIPPED | OUTPATIENT
Start: 2020-11-27 | End: 2021-01-26

## 2020-11-27 RX ORDER — METOPROLOL SUCCINATE 100 MG/1
100 TABLET, EXTENDED RELEASE ORAL DAILY
Qty: 30 TABLET | Refills: 1 | Status: SHIPPED | OUTPATIENT
Start: 2020-11-27 | End: 2021-01-26

## 2021-03-10 ENCOUNTER — TELEPHONE (OUTPATIENT)
Dept: FAMILY MEDICINE | Facility: CLINIC | Age: 66
End: 2021-03-10

## 2021-03-10 NOTE — LETTER
[facility]  [address]  [facility] [place] [facility] [id number] [phone]       March 10, 2021    Darron Schwarz  [address] [facility] [address]    Dear Darron,    We care about your health and have reviewed your health plan and are making recommendations based on this review, to optimize your health.    You are in particular need of attention regarding:  -Depression  -High Blood Pressure  -Colon Cancer Screening  -Wellness (Physical) Visit     We are recommending that you:  -schedule a FOLLOWUP OFFICE APPOINTMENT with Dr. Sue for blood pressure and depression recheck. Please complete the PHQ-9 questionnaire attached to this letter and return it in the envelope provided.     -schedule a WELLNESS (Physical) APPOINTMENT with Dr. Sue    -schedule a COLONOSCOPY to look for colon cancer (due every 10 years or 5 years in higher risk situations.)        Colon cancer is now the second leading cause of cancer-related deaths in the [place] for both men and women and there are over 130,000 new cases and 50,000 deaths per year from colon cancer.  Colonoscopies can prevent 90-95% of these deaths.  Problem lesions can be removed before they ever become cancer.  This test is not only looking for cancer, but also getting rid of precancerious lesions.    If you are under/uninsured, we recommend you contact the milog program. milog is a free colorectal cancer screening program that provides colonoscopies for eligible under/uninsured [place] men and women. If you are interested in receiving a free colonoscopy, please call milog at [phone] (mention code [employer]) to see if you re eligible.      If you do not wish to do a colonoscopy or cannot afford to do one, at this time, there is another option. It is called a FIT test or Fecal Immunochemical Occult Blood Test (take home stool sample kit).  It does not replace the colonoscopy for colorectal cancer  screening, but it can detect hidden bleeding in the lower colon.  It does need to be repeated every year and if a positive result is obtained, you would be referred for a colonoscopy.          If you have completed either one of these tests at another facility, please call with the details of when and where the tests were done and if they were normal or not. Or have the records sent to our clinic so that we can best coordinate your care.    To address the above recommendations, we encourage you to contact us at [phone], via [employer] or by contacting Central Scheduling toll free at [phone] 24 hours a day. They will assist you with finding the most convenient time and location.    Thank you for trusting [facility] and we appreciate the opportunity to serve you.  We look forward to supporting your healthcare needs in the future.    Healthy Regards,    Your [facility] Team

## 2021-03-10 NOTE — TELEPHONE ENCOUNTER
Patient Quality Outreach      Summary:    Patient has the following on his problem list/HM:   Depression / Dysthymia review    6 Month Remission: 4-8 month window range:   12 Month Remission: 10-14 month window range:        PHQ-9 SCORE 9/9/2016 6/2/2017 9/4/2018   PHQ-9 Total Score - - -   PHQ-9 Total Score 1 0 0       If PHQ-9 recheck is 5 or more, route to provider for next steps.    Hypertension   Last three blood pressure readings:  BP Readings from Last 3 Encounters:   09/05/18 135/85   01/24/18 (!) 155/100   06/02/17 (!) 130/92     Blood pressure: Failed    HTN Guidelines:  ? 139/89     Patient is due/failing the following:   Hypertension follow-up visit, Colonoscopy, PHQ-9 Needed and Annual wellness, date due: 9/6/2020    Type of outreach:    Sent letter.    Questions for provider review:    None                                                                                                                                     Kimberlee Hargrove MA on 3/10/2021 at 12:34 PM       Chart routed to .

## 2021-04-20 DIAGNOSIS — I10 ESSENTIAL HYPERTENSION WITH GOAL BLOOD PRESSURE LESS THAN 140/90: ICD-10-CM

## 2021-04-20 RX ORDER — METOPROLOL SUCCINATE 100 MG/1
TABLET, EXTENDED RELEASE ORAL
Qty: 30 TABLET | Refills: 0 | Status: SHIPPED | OUTPATIENT
Start: 2021-04-20 | End: 2021-06-03

## 2021-04-20 RX ORDER — LISINOPRIL 40 MG/1
TABLET ORAL
Qty: 30 TABLET | Refills: 0 | Status: SHIPPED | OUTPATIENT
Start: 2021-04-20 | End: 2021-06-03

## 2021-04-20 NOTE — TELEPHONE ENCOUNTER
Metoprolol, Lisinopril:    Medication is being filled for 1 time refill only due to:  Patient needs to be seen because it has been more than one year since last visit.     Virtual visit 4/10/2020  Due for OV and labs    Sarah Randolph RN

## 2021-06-03 DIAGNOSIS — I10 ESSENTIAL HYPERTENSION WITH GOAL BLOOD PRESSURE LESS THAN 140/90: ICD-10-CM

## 2021-06-03 RX ORDER — LISINOPRIL 40 MG/1
TABLET ORAL
Qty: 30 TABLET | Refills: 0 | Status: SHIPPED | OUTPATIENT
Start: 2021-06-03 | End: 2021-07-02

## 2021-06-03 RX ORDER — METOPROLOL SUCCINATE 100 MG/1
TABLET, EXTENDED RELEASE ORAL
Qty: 30 TABLET | Refills: 0 | Status: SHIPPED | OUTPATIENT
Start: 2021-06-03 | End: 2021-07-02

## 2021-06-03 NOTE — TELEPHONE ENCOUNTER
Routing refill request to provider for review/approval because:  Rosalba given x1 and patient did not follow up, please advise  Left VM with pt to schedule physical  Has also been sent MyChart reminders      Thank you,  lEana Mehta RN

## 2021-07-01 DIAGNOSIS — I10 ESSENTIAL HYPERTENSION WITH GOAL BLOOD PRESSURE LESS THAN 140/90: ICD-10-CM

## 2021-07-02 RX ORDER — LISINOPRIL 40 MG/1
TABLET ORAL
Qty: 30 TABLET | Refills: 0 | Status: SHIPPED | OUTPATIENT
Start: 2021-07-02 | End: 2021-08-02

## 2021-07-02 RX ORDER — METOPROLOL SUCCINATE 100 MG/1
TABLET, EXTENDED RELEASE ORAL
Qty: 30 TABLET | Refills: 0 | Status: SHIPPED | OUTPATIENT
Start: 2021-07-02 | End: 2021-08-02

## 2021-07-02 NOTE — TELEPHONE ENCOUNTER
LS,  Left VM #2 for patient  See below messages  No response from patient to our outreach  Please advise on further refill  Thanks,  Kamala GRANDA RN

## 2021-07-30 DIAGNOSIS — I10 ESSENTIAL HYPERTENSION WITH GOAL BLOOD PRESSURE LESS THAN 140/90: ICD-10-CM

## 2021-08-02 RX ORDER — METOPROLOL SUCCINATE 100 MG/1
TABLET, EXTENDED RELEASE ORAL
Qty: 30 TABLET | Refills: 0 | Status: SHIPPED | OUTPATIENT
Start: 2021-08-02 | End: 2021-08-10

## 2021-08-02 RX ORDER — LISINOPRIL 40 MG/1
TABLET ORAL
Qty: 30 TABLET | Refills: 0 | Status: SHIPPED | OUTPATIENT
Start: 2021-08-02 | End: 2021-08-10

## 2021-08-02 NOTE — TELEPHONE ENCOUNTER
Routing refill request to provider for review/approval because:  Labs out of range:  last BP 2018  Labs not current:    Has apt with 8/10.  Please authorize if appropriate.  Thanks,  Diamante Ortiz RN

## 2021-08-09 NOTE — PATIENT INSTRUCTIONS
Preventive Health Recommendations:     See your health care provider every year to    Review health changes.     Discuss preventive care.      Review your medicines if your doctor has prescribed any.      Talk with your health care provider about whether you should have a test to screen for prostate cancer (PSA).    Every 3 years, have a diabetes test (fasting glucose). If you are at risk for diabetes, you should have this test more often.    Every 5 years, have a cholesterol test. Have this test more often if you are at risk for high cholesterol or heart disease.     Every 10 years, have a colonoscopy. Or, have a yearly FIT test (stool test). These exams will check for colon cancer.    Talk to with your health care provider about screening for Abdominal Aortic Aneurysm if you have a family history of AAA or have a history of smoking.    Shots:     Get a flu shot each year.     Get a tetanus shot every 10 years.     Talk to your doctor about your pneumonia vaccines. There are now two you should receive - Pneumovax (PPSV 23) and Prevnar (PCV 13).     Talk to your pharmacist about a shingles vaccine.     Talk to your doctor about the hepatitis B vaccine.  Nutrition:     Eat at least 5 servings of fruits and vegetables each day.     Eat whole-grain bread, whole-wheat pasta and brown rice instead of white grains and rice.     Get adequate Calcium and Vitamin D.   Lifestyle    Exercise for at least 150 minutes a week (30 minutes a day, 5 days a week). This will help you control your weight and prevent disease.     Limit alcohol to one drink per day.     No smoking.     Wear sunscreen to prevent skin cancer.    See your dentist every six months for an exam and cleaning.    See your eye doctor every 1 to 2 years to screen for conditions such as glaucoma, macular degeneration, cataracts, etc.    Personalized Prevention Plan  You are due for the preventive services outlined below.  Your care team is available to assist you  in scheduling these services.  If you have already completed any of these items, please share that information with your care team to update in your medical record.  Health Maintenance Due   Topic Date Due     ANNUAL REVIEW OF HM ORDERS  Never done     Discuss Advance Care Planning  Never done     Hepatitis C Screening  Never done     Zoster (Shingles) Vaccine (1 of 2) Never done     Depression Assessment  03/04/2019     Diptheria Tetanus Pertussis (DTAP/TDAP/TD) Vaccine (3 - Td or Tdap) 02/23/2020     Colorectal Cancer Screening  03/15/2020     Annual Wellness Visit  09/06/2020     FALL RISK ASSESSMENT  Never done     Pneumococcal Vaccine (1 of 1 - PPSV23) Never done     AORTIC ANEURYSM SCREENING (SYSTEM ASSIGNED)  Never done

## 2021-08-09 NOTE — PROGRESS NOTES
"SUBJECTIVE:   Darron Schwarz is a 65 year old male who presents for Preventive Visit.      Patient has been advised of split billing requirements and indicates understanding: Yes   Are you in the first 12 months of your Medicare coverage?  No     P.t. states would like to  discuss  blood pressure medications and have prescriptions for refills. Also would like to have a physical and need lab work.  Healthy Habits:     In general, how would you rate your overall health?  Very good    Frequency of exercise:  2-3 days/week    Duration of exercise:  15-30 minutes    Do you usually eat at least 4 servings of fruit and vegetables a day, include whole grains    & fiber and avoid regularly eating high fat or \"junk\" foods?  Yes    Taking medications regularly:  Yes    Barriers to taking medications:  None    Medication side effects:  None    Ability to successfully perform activities of daily living:  No assistance needed    Home Safety:  No safety concerns identified    Hearing Impairment:  No hearing concerns    In the past 6 months, have you been bothered by leaking of urine?  No    In general, how would you rate your overall mental or emotional health?  Very good      PHQ-2 Total Score: 0    Additional concerns today:  No (Flare up of gout )    Do you feel safe in your environment? Yes    Have you ever done Advance Care Planning? (For example, a Health Directive, POLST, or a discussion with a medical provider or your loved ones about your wishes): No, advance care planning information given to patient to review.  Advanced care planning was discussed at today's visit.       Fall risk  Fallen 2 or more times in the past year?: No  Any fall with injury in the past year?: No    Cognitive Screening   1) Repeat 3 items (Leader, Season, Table)    2) Clock draw: NORMAL  3) 3 item recall: Recalls 3 objects  Results: 3 items recalled: COGNITIVE IMPAIRMENT LESS LIKELY    Mini-CogTM Copyright S Hellen. Licensed by the author for " use in [facility]; reprinted with permission ([email or web address]@.[facility]). All rights reserved.      Do you have sleep apnea, excessive snoring or daytime drowsiness?: no    Reviewed and updated as needed this visit by clinical staff  Tobacco     Med Hx  Surg Hx  Fam Hx  Soc Hx        Reviewed and updated as needed this visit by Provider                Social History     Tobacco Use     Smoking status: Never Smoker     Smokeless tobacco: Never Used   Substance Use Topics     Alcohol use: Yes     Alcohol/week: 0.0 standard drinks     Comment: 5-6 drinks per week     If you drink alcohol do you typically have >3 drinks per day or >7 drinks per week? No    Alcohol Use 9/4/2018   Prescreen: >3 drinks/day or >7 drinks/week? No   Prescreen: >3 drinks/day or >7 drinks/week? -   No flowsheet data found.            Current providers sharing in care for this patient include:   Patient Care Team:  Eli Sue MD as PCP - General (Family Practice)  Eli Sue MD as Assigned PCP    The following health maintenance items are reviewed in Epic and correct as of today:  Health Maintenance Due   Topic Date Due     ANNUAL REVIEW OF HM ORDERS  Never done     ADVANCE CARE PLANNING  Never done     HEPATITIS C SCREENING  Never done     ZOSTER IMMUNIZATION (1 of 2) Never done     DTAP/TDAP/TD IMMUNIZATION (3 - Td or Tdap) 02/23/2020     COLORECTAL CANCER SCREENING  03/15/2020     FALL RISK ASSESSMENT  Never done     Pneumococcal Vaccine: 65+ Years (1 of 1 - PPSV23) Never done     AORTIC ANEURYSM SCREENING (SYSTEM ASSIGNED)  Never done     Labs reviewed in EPIC  BP Readings from Last 3 Encounters:   09/05/18 135/85   01/24/18 (!) 155/100   06/02/17 (!) 130/92    Wt Readings from Last 3 Encounters:   08/10/21 105.7 kg (233 lb)   09/04/18 89.8 kg (198 lb)   01/24/18 98.2 kg (216 lb 8 oz)                  Patient Active Problem List   Diagnosis     Hypertension goal BP (blood pressure) < 140/90     Hyperlipidemia LDL goal <130      Moderate major depression (H)     Adjustment disorder with anxiety     Essential hypertension with goal blood pressure less than 140/90     Moderate episode of recurrent major depressive disorder (H)     Past Surgical History:   Procedure Laterality Date     C EXCISN COARCT AORTA DRCT ANAST      Hx aortic coaraction repair age 19     COLONOSCOPY  03/2009     HC TOOTH EXTRACTION W/FORCEP  age 21    wisdom teeth       Social History     Tobacco Use     Smoking status: Never Smoker     Smokeless tobacco: Never Used   Substance Use Topics     Alcohol use: Yes     Alcohol/week: 0.0 standard drinks     Comment: 5-6 drinks per week     Family History   Problem Relation Age of Onset     Psychotic Disorder Mother      Breast Cancer Mother      Mental Illness Mother         Bipolar disorder     Alcohol/Drug Father      Hypertension Father      Other Cancer Father      Alcohol/Drug Brother      Hypertension Brother      Cardiovascular Sister      Hypertension Sister      Hyperlipidemia Sister      Cardiovascular Brother      Hypertension Sister      Hypertension Brother      Alzheimer Disease Sister          Current Outpatient Medications   Medication Sig Dispense Refill     hydrochlorothiazide (HYDRODIURIL) 12.5 MG tablet Take 1 tablet (12.5 mg) by mouth daily 30 tablet 1     lisinopril (ZESTRIL) 40 MG tablet Take 1 tablet (40 mg) by mouth daily 90 tablet 1     metoprolol succinate ER (TOPROL-XL) 100 MG 24 hr tablet Take 1 tablet (100 mg) by mouth daily 90 tablet 1     VITAMIN C 500 MG PO TABS 1 TABLET DAILY       Allergies   Allergen Reactions     Atorvastatin Calcium      malaise     Recent Labs   Lab Test 08/10/21  0849 08/17/18  0905 06/02/17  0948   LDL 97 74 92   HDL 31* 25* 37*   TRIG 669* 304* 322*   ALT 43 52 41   CR 0.91 0.85 0.96   GFRESTIMATED 88 >90 79   GFRESTBLACK  --  >90 >90  African American GFR Calc     POTASSIUM 4.2 4.3 4.7      Pneumonia Vaccine:Adults age 65+ who have not received previous Pneumovax  "(PPSV23) or PCV13 as an adult: Should first be given PCV13 AND then should be given PPSV23 6-12 months after PCV13        Review of Systems  Constitutional, HEENT, cardiovascular, pulmonary, GI, , musculoskeletal, neuro, skin, endocrine and psych systems are negative, except as otherwise noted.    OBJECTIVE:   There were no vitals taken for this visit. Estimated body mass index is 27.42 kg/m  as calculated from the following:    Height as of 9/4/18: 1.81 m (5' 11.25\").    Weight as of 9/4/18: 89.8 kg (198 lb).  Physical Exam  GENERAL: healthy, alert and no distress  EYES: Eyes grossly normal to inspection, PERRL and conjunctivae and sclerae normal  HENT: ear canals and TM's normal, nose and mouth without ulcers or lesions  NECK: no adenopathy, no asymmetry, masses, or scars and thyroid normal to palpation  RESP: lungs clear to auscultation - no rales, rhonchi or wheezes  CV: regular rate and rhythm, normal S1 S2, no S3 or S4, no murmur, click or rub, no peripheral edema and peripheral pulses strong  ABDOMEN: soft, nontender, no hepatosplenomegaly, no masses and bowel sounds normal  MS: no gross musculoskeletal defects noted, no edema  SKIN: no suspicious lesions or rashes  NEURO: Normal strength and tone, mentation intact and speech normal  PSYCH: mentation appears normal, affect normal/bright    Diagnostic Test Results:  Labs reviewed in Epic  Results for orders placed or performed in visit on 08/10/21 (from the past 24 hour(s))   Lipid panel reflex to direct LDL Fasting   Result Value Ref Range    Cholesterol 229 (H) <200 mg/dL    Triglycerides 669 (H) <150 mg/dL    Direct Measure HDL 31 (L) >=40 mg/dL    LDL Cholesterol Calculated      Non HDL Cholesterol 198 (H) <130 mg/dL    Patient Fasting > 8hrs? Yes    HIV Antigen Antibody Combo   Result Value Ref Range    HIV Antigen Antibody Combo Nonreactive Nonreactive   Hepatitis C antibody   Result Value Ref Range    Hepatitis C Antibody Nonreactive Nonreactive    " Narrative    Assay performance characteristics have not been established for newborns, infants, and children.   Comprehensive metabolic panel (BMP + Alb, Alk Phos, ALT, AST, Total. Bili, TP)   Result Value Ref Range    Sodium 139 133 - 144 mmol/L    Potassium 4.2 3.4 - 5.3 mmol/L    Chloride 106 94 - 109 mmol/L    Carbon Dioxide (CO2) 22 20 - 32 mmol/L    Anion Gap 11 3 - 14 mmol/L    Urea Nitrogen 10 7 - 30 mg/dL    Creatinine 0.91 0.66 - 1.25 mg/dL    Calcium 8.8 8.5 - 10.1 mg/dL    Glucose 111 (H) 70 - 99 mg/dL    Alkaline Phosphatase 79 40 - 150 U/L    AST 27 0 - 45 U/L    ALT 43 0 - 70 U/L    Protein Total 7.3 6.8 - 8.8 g/dL    Albumin 3.8 3.4 - 5.0 g/dL    Bilirubin Total 0.5 0.2 - 1.3 mg/dL    GFR Estimate 88 >60 mL/min/1.73m2   LDL cholesterol direct   Result Value Ref Range    LDL Cholesterol Direct 97 <100 mg/dL       ASSESSMENT / PLAN:   1. Routine general medical examination at a health care facility  Last colonoscopy was over ten yrs ago , he will schedule the colonoscopy   - Adult Gastro Ref - Procedure Only; Future  - COLOGUARD(EXACT SCIENCES)  - Pneumococcal vaccine 13 valent PCV13 IM (Prevnar) [[id number]]  - HIV Antigen Antibody Combo; Future  - Hepatitis C antibody; Future  - HIV Antigen Antibody Combo  - Hepatitis C antibody    2. Screen for colon cancer  As above     3. Essential hypertension with goal blood pressure less than 140/90  We discussed adding hydrochlorothiazide he used to be on it in the past but had stopped it , now his BP is high again , could be because he gained some weight   - lisinopril (ZESTRIL) 40 MG tablet; Take 1 tablet (40 mg) by mouth daily  Dispense: 90 tablet; Refill: 1  - metoprolol succinate ER (TOPROL-XL) 100 MG 24 hr tablet; Take 1 tablet (100 mg) by mouth daily  Dispense: 90 tablet; Refill: 1  - Lipid panel reflex to direct LDL Fasting; Future  - hydrochlorothiazide (HYDRODIURIL) 12.5 MG tablet; Take 1 tablet (12.5 mg) by mouth daily  Dispense: 30 tablet; Refill: 1  -  "Comprehensive metabolic panel (BMP + Alb, Alk Phos, ALT, AST, Total. Bili, TP); Future  - Lipid panel reflex to direct LDL Fasting  - Comprehensive metabolic panel (BMP + Alb, Alk Phos, ALT, AST, Total. Bili, TP)  - LDL cholesterol direct  Will follow up in two weeks for BP recheck and he will be monitoring his BP at home too   4. Hyperlipidemia LDL goal <130  Is fasting for recheck   - Lipid panel reflex to direct LDL Fasting; Future  - Comprehensive metabolic panel (BMP + Alb, Alk Phos, ALT, AST, Total. Bili, TP); Future  - Lipid panel reflex to direct LDL Fasting  - Comprehensive metabolic panel (BMP + Alb, Alk Phos, ALT, AST, Total. Bili, TP)  - LDL cholesterol direct    5. Moderate major depression (H)  Stable     6. Onychomycosis  We discussed seeing podiatry , dry skin in legs and also some edema, but starting the diuretic and hoepfully that will help with the edema, hx of gout attacks , he is not on any preventative medication , has used tart cherry juice for it .  - Orthopedic  Referral; Future    Patient has been advised of split billing requirements and indicates understanding: Yes  COUNSELING:  Reviewed preventive health counseling, as reflected in patient instructions       Regular exercise       Healthy diet/nutrition       Vision screening       Hearing screening       Dental care    Estimated body mass index is 27.42 kg/m  as calculated from the following:    Height as of 9/4/18: 1.81 m (5' 11.25\").    Weight as of 9/4/18: 89.8 kg (198 lb).        He reports that he has never smoked. He has never used smokeless tobacco.      Appropriate preventive services were discussed with this patient, including applicable screening as appropriate for cardiovascular disease, diabetes, osteopenia/osteoporosis, and glaucoma.  As appropriate for age/gender, discussed screening for colorectal cancer, prostate cancer, breast cancer, and cervical cancer. Checklist reviewing preventive services available has " been given to the patient.    Reviewed patients plan of care and provided an AVS. The Intermediate Care Plan ( asthma action plan, low back pain action plan, and migraine action plan) for Darron meets the Care Plan requirement. This Care Plan has been established and reviewed with the Patient.    Counseling Resources:  ATP IV Guidelines  Pooled Cohorts Equation Calculator  Breast Cancer Risk Calculator  Breast Cancer: Medication to Reduce Risk  FRAX Risk Assessment  ICSI Preventive Guidelines  Dietary Guidelines for Americans, 2010  [employer]'s MyPlate  ASA Prophylaxis  Lung CA Screening    Eli Sue MD  [facility]    Identified Health Risks:  Answers for HPI/ROS submitted by the patient on 8/10/2021  If you checked off any problems, how difficult have these problems made it for you to do your work, take care of things at home, or get along with other people?: Not difficult at all  PHQ9 TOTAL SCORE: 0

## 2021-08-10 ENCOUNTER — OFFICE VISIT (OUTPATIENT)
Dept: FAMILY MEDICINE | Facility: CLINIC | Age: 66
End: 2021-08-10
Payer: COMMERCIAL

## 2021-08-10 VITALS
OXYGEN SATURATION: 97 % | TEMPERATURE: 97 F | WEIGHT: 233 LBS | HEIGHT: 71 IN | BODY MASS INDEX: 32.62 KG/M2 | HEART RATE: 85 BPM | RESPIRATION RATE: 16 BRPM

## 2021-08-10 DIAGNOSIS — I10 ESSENTIAL HYPERTENSION WITH GOAL BLOOD PRESSURE LESS THAN 140/90: ICD-10-CM

## 2021-08-10 DIAGNOSIS — Z00.00 ROUTINE GENERAL MEDICAL EXAMINATION AT A HEALTH CARE FACILITY: Primary | ICD-10-CM

## 2021-08-10 DIAGNOSIS — E78.5 HYPERLIPIDEMIA LDL GOAL <130: ICD-10-CM

## 2021-08-10 DIAGNOSIS — F32.1 MODERATE MAJOR DEPRESSION (H): ICD-10-CM

## 2021-08-10 DIAGNOSIS — E78.1 HYPERTRIGLYCERIDEMIA: ICD-10-CM

## 2021-08-10 DIAGNOSIS — B35.1 ONYCHOMYCOSIS: ICD-10-CM

## 2021-08-10 DIAGNOSIS — Z12.11 SCREEN FOR COLON CANCER: ICD-10-CM

## 2021-08-10 LAB
ALBUMIN SERPL-MCNC: 3.8 G/DL (ref 3.4–5)
ALP SERPL-CCNC: 79 U/L (ref 40–150)
ALT SERPL W P-5'-P-CCNC: 43 U/L (ref 0–70)
ANION GAP SERPL CALCULATED.3IONS-SCNC: 11 MMOL/L (ref 3–14)
AST SERPL W P-5'-P-CCNC: 27 U/L (ref 0–45)
BILIRUB SERPL-MCNC: 0.5 MG/DL (ref 0.2–1.3)
BUN SERPL-MCNC: 10 MG/DL (ref 7–30)
CALCIUM SERPL-MCNC: 8.8 MG/DL (ref 8.5–10.1)
CHLORIDE BLD-SCNC: 106 MMOL/L (ref 94–109)
CHOLEST SERPL-MCNC: 229 MG/DL
CO2 SERPL-SCNC: 22 MMOL/L (ref 20–32)
CREAT SERPL-MCNC: 0.91 MG/DL (ref 0.66–1.25)
FASTING STATUS PATIENT QL REPORTED: YES
GFR SERPL CREATININE-BSD FRML MDRD: 88 ML/MIN/1.73M2
GLUCOSE BLD-MCNC: 111 MG/DL (ref 70–99)
HCV AB SERPL QL IA: NONREACTIVE
HDLC SERPL-MCNC: 31 MG/DL
HIV 1+2 AB+HIV1 P24 AG SERPL QL IA: NONREACTIVE
LDLC SERPL CALC-MCNC: 97 MG/DL
LDLC SERPL CALC-MCNC: ABNORMAL MG/DL
NONHDLC SERPL-MCNC: 198 MG/DL
POTASSIUM BLD-SCNC: 4.2 MMOL/L (ref 3.4–5.3)
PROT SERPL-MCNC: 7.3 G/DL (ref 6.8–8.8)
SODIUM SERPL-SCNC: 139 MMOL/L (ref 133–144)
TRIGL SERPL-MCNC: 669 MG/DL

## 2021-08-10 PROCEDURE — 87389 HIV-1 AG W/HIV-1&-2 AB AG IA: CPT | Performed by: FAMILY MEDICINE

## 2021-08-10 PROCEDURE — 36415 COLL VENOUS BLD VENIPUNCTURE: CPT | Performed by: FAMILY MEDICINE

## 2021-08-10 PROCEDURE — 90471 IMMUNIZATION ADMIN: CPT | Performed by: FAMILY MEDICINE

## 2021-08-10 PROCEDURE — 80061 LIPID PANEL: CPT | Performed by: FAMILY MEDICINE

## 2021-08-10 PROCEDURE — 83721 ASSAY OF BLOOD LIPOPROTEIN: CPT | Mod: 59 | Performed by: FAMILY MEDICINE

## 2021-08-10 PROCEDURE — 99397 PER PM REEVAL EST PAT 65+ YR: CPT | Mod: 25 | Performed by: FAMILY MEDICINE

## 2021-08-10 PROCEDURE — 99214 OFFICE O/P EST MOD 30 MIN: CPT | Mod: 25 | Performed by: FAMILY MEDICINE

## 2021-08-10 PROCEDURE — 90670 PCV13 VACCINE IM: CPT | Performed by: FAMILY MEDICINE

## 2021-08-10 PROCEDURE — 80053 COMPREHEN METABOLIC PANEL: CPT | Performed by: FAMILY MEDICINE

## 2021-08-10 PROCEDURE — 86803 HEPATITIS C AB TEST: CPT | Performed by: FAMILY MEDICINE

## 2021-08-10 RX ORDER — METOPROLOL SUCCINATE 100 MG/1
100 TABLET, EXTENDED RELEASE ORAL DAILY
Qty: 90 TABLET | Refills: 1 | Status: SHIPPED | OUTPATIENT
Start: 2021-08-10 | End: 2022-02-28

## 2021-08-10 RX ORDER — HYDROCHLOROTHIAZIDE 12.5 MG/1
12.5 TABLET ORAL DAILY
Qty: 30 TABLET | Refills: 1 | Status: SHIPPED | OUTPATIENT
Start: 2021-08-10 | End: 2021-10-07

## 2021-08-10 RX ORDER — LISINOPRIL 40 MG/1
40 TABLET ORAL DAILY
Qty: 90 TABLET | Refills: 1 | Status: SHIPPED | OUTPATIENT
Start: 2021-08-10 | End: 2022-02-28

## 2021-08-10 ASSESSMENT — ACTIVITIES OF DAILY LIVING (ADL): CURRENT_FUNCTION: NO ASSISTANCE NEEDED

## 2021-08-10 ASSESSMENT — MIFFLIN-ST. JEOR: SCORE: 1864.01

## 2021-08-10 ASSESSMENT — PATIENT HEALTH QUESTIONNAIRE - PHQ9
SUM OF ALL RESPONSES TO PHQ QUESTIONS 1-9: 0
SUM OF ALL RESPONSES TO PHQ QUESTIONS 1-9: 0
10. IF YOU CHECKED OFF ANY PROBLEMS, HOW DIFFICULT HAVE THESE PROBLEMS MADE IT FOR YOU TO DO YOUR WORK, TAKE CARE OF THINGS AT HOME, OR GET ALONG WITH OTHER PEOPLE: NOT DIFFICULT AT ALL

## 2021-08-11 RX ORDER — FENOFIBRATE 48 MG/1
48 TABLET, COATED ORAL DAILY
Qty: 30 TABLET | Refills: 3 | Status: SHIPPED | OUTPATIENT
Start: 2021-08-11 | End: 2021-12-01

## 2021-08-31 DIAGNOSIS — I10 ESSENTIAL HYPERTENSION WITH GOAL BLOOD PRESSURE LESS THAN 140/90: ICD-10-CM

## 2021-09-01 RX ORDER — LISINOPRIL 40 MG/1
TABLET ORAL
Start: 2021-09-01

## 2021-09-01 RX ORDER — METOPROLOL SUCCINATE 100 MG/1
TABLET, EXTENDED RELEASE ORAL
Start: 2021-09-01

## 2021-09-01 NOTE — TELEPHONE ENCOUNTER
Metoprolol, Lisinopril    Duplicate.  Rx sent for both 8/10/21 for #90 with 1 refill.  Sarah Randolph RN

## 2021-09-18 ENCOUNTER — HEALTH MAINTENANCE LETTER (OUTPATIENT)
Age: 66
End: 2021-09-18

## 2021-11-03 DIAGNOSIS — I10 ESSENTIAL HYPERTENSION WITH GOAL BLOOD PRESSURE LESS THAN 140/90: ICD-10-CM

## 2021-11-04 NOTE — TELEPHONE ENCOUNTER
Hydrochlorothiazide:    One month supply sent 10/7.  Due for f/u    [employer] message sent to patient asking him to schedule appointment.  Sarah Randolph RN

## 2021-11-08 RX ORDER — HYDROCHLOROTHIAZIDE 12.5 MG/1
TABLET ORAL
Qty: 30 TABLET | Refills: 0 | Status: SHIPPED | OUTPATIENT
Start: 2021-11-08 | End: 2021-12-06

## 2021-11-08 NOTE — TELEPHONE ENCOUNTER
LS,  See below messages  No response from patient to our outreach  Please advise on further refill  Thanks,  Kamala GRANDA RN

## 2021-12-01 DIAGNOSIS — E78.1 HYPERTRIGLYCERIDEMIA: ICD-10-CM

## 2021-12-01 RX ORDER — FENOFIBRATE 48 MG/1
TABLET, COATED ORAL
Qty: 30 TABLET | Refills: 3 | Status: SHIPPED | OUTPATIENT
Start: 2021-12-01 | End: 2022-03-28

## 2021-12-01 NOTE — TELEPHONE ENCOUNTER
Routing refill request to provider for review/approval because:  Labs out of range:  lipid panel   Med started Aug 2021 - do you want lab recheck now?  Lab order needed  Kamala GRANDA RN

## 2022-03-05 DIAGNOSIS — I10 ESSENTIAL HYPERTENSION WITH GOAL BLOOD PRESSURE LESS THAN 140/90: ICD-10-CM

## 2022-03-07 RX ORDER — HYDROCHLOROTHIAZIDE 12.5 MG/1
TABLET ORAL
Qty: 90 TABLET | Refills: 0 | Status: SHIPPED | OUTPATIENT
Start: 2022-03-07 | End: 2022-06-06

## 2022-03-07 NOTE — TELEPHONE ENCOUNTER
Routing refill request to provider for review/approval because:  Labs not current:  last BP 2018  Please authorize if appropriate.  Thanks,  Diamante Ortiz RN

## 2022-06-06 DIAGNOSIS — I10 ESSENTIAL HYPERTENSION WITH GOAL BLOOD PRESSURE LESS THAN 140/90: ICD-10-CM

## 2022-06-07 RX ORDER — HYDROCHLOROTHIAZIDE 12.5 MG/1
TABLET ORAL
Qty: 90 TABLET | Refills: 0 | Status: SHIPPED | OUTPATIENT
Start: 2022-06-07 | End: 2022-08-29

## 2022-06-07 NOTE — TELEPHONE ENCOUNTER
LS,    Routing refill request to provider for review/approval because:    BP Readings from Last 3 Encounters:   09/05/18 135/85   01/24/18 (!) 155/100   06/02/17 (!) 130/92     Last documented BP was 9/5/2018  Last OV 8/10/21 for physical - no BP documented.    Sarah Randolph RN

## 2022-06-23 DIAGNOSIS — E78.1 HYPERTRIGLYCERIDEMIA: ICD-10-CM

## 2022-06-27 RX ORDER — FENOFIBRATE 48 MG/1
TABLET, COATED ORAL
Qty: 90 TABLET | Refills: 0 | Status: SHIPPED | OUTPATIENT
Start: 2022-06-27 | End: 2022-07-26

## 2022-06-27 NOTE — TELEPHONE ENCOUNTER
Prescription approved per [facility] Refill Protocol.  Due for physical August 2022  Kamala GRANDA RN

## 2022-07-26 RX ORDER — FENOFIBRATE 54 MG/1
54 TABLET ORAL DAILY
Qty: 30 TABLET | Refills: 0 | Status: SHIPPED | OUTPATIENT
Start: 2022-07-26 | End: 2022-08-24

## 2022-07-26 NOTE — TELEPHONE ENCOUNTER
Triage,   Kalpesh called.   States his insurance does not cover the 48 mg of Fenofibrate  His insurance will cover 54 mg.   Please advise.   Thanks!  Lis PEREZ

## 2022-07-26 NOTE — TELEPHONE ENCOUNTER
LS,      Please see message below from CHRIS Hays.  Order T'd up for 54 mg     Patient due for physical in August.    Thank you,  Sarah Randolph RN

## 2022-08-23 DIAGNOSIS — E78.1 HYPERTRIGLYCERIDEMIA: ICD-10-CM

## 2022-08-24 RX ORDER — FENOFIBRATE 54 MG/1
TABLET ORAL
Qty: 30 TABLET | Refills: 0 | Status: SHIPPED | OUTPATIENT
Start: 2022-08-24 | End: 2022-09-09

## 2022-08-24 NOTE — TELEPHONE ENCOUNTER
One month supply sent 7/26/22.  Due for physical and fasting labs    Future appointment 9/13.  Refill sent for one more month supply.    Sarah Randolph RN

## 2022-09-05 ASSESSMENT — ENCOUNTER SYMPTOMS
EYE PAIN: 0
DIZZINESS: 0
CHILLS: 0
FREQUENCY: 0
DIARRHEA: 0
HEMATOCHEZIA: 0
PALPITATIONS: 0
COUGH: 0
JOINT SWELLING: 0
DYSURIA: 0
ARTHRALGIAS: 0
ABDOMINAL PAIN: 0
MYALGIAS: 0
HEARTBURN: 0
CONSTIPATION: 0
HEADACHES: 0
NERVOUS/ANXIOUS: 0
WEAKNESS: 0
HEMATURIA: 0
SHORTNESS OF BREATH: 0
FEVER: 0
SORE THROAT: 0
NAUSEA: 0

## 2022-09-05 ASSESSMENT — ACTIVITIES OF DAILY LIVING (ADL): CURRENT_FUNCTION: NO ASSISTANCE NEEDED

## 2022-09-09 ENCOUNTER — OFFICE VISIT (OUTPATIENT)
Dept: FAMILY MEDICINE | Facility: CLINIC | Age: 67
End: 2022-09-09
Payer: COMMERCIAL

## 2022-09-09 DIAGNOSIS — Z00.00 ENCOUNTER FOR ROUTINE ADULT HEALTH EXAMINATION WITHOUT ABNORMAL FINDINGS: Primary | ICD-10-CM

## 2022-09-09 DIAGNOSIS — M10.9 ACUTE GOUT INVOLVING TOE OF RIGHT FOOT, UNSPECIFIED CAUSE: ICD-10-CM

## 2022-09-09 DIAGNOSIS — L72.3 SEBACEOUS CYST: ICD-10-CM

## 2022-09-09 DIAGNOSIS — Z12.11 SCREEN FOR COLON CANCER: ICD-10-CM

## 2022-09-09 DIAGNOSIS — F32.1 MODERATE MAJOR DEPRESSION (H): ICD-10-CM

## 2022-09-09 DIAGNOSIS — Z23 ENCOUNTER FOR IMMUNIZATION: ICD-10-CM

## 2022-09-09 DIAGNOSIS — R73.01 ELEVATED FASTING BLOOD SUGAR: ICD-10-CM

## 2022-09-09 DIAGNOSIS — I10 ESSENTIAL HYPERTENSION WITH GOAL BLOOD PRESSURE LESS THAN 140/90: ICD-10-CM

## 2022-09-09 DIAGNOSIS — E78.1 HYPERTRIGLYCERIDEMIA: ICD-10-CM

## 2022-09-09 LAB — HBA1C MFR BLD: 5.4 % (ref 0–5.6)

## 2022-09-09 PROCEDURE — 90471 IMMUNIZATION ADMIN: CPT | Performed by: FAMILY MEDICINE

## 2022-09-09 PROCEDURE — 83036 HEMOGLOBIN GLYCOSYLATED A1C: CPT | Performed by: FAMILY MEDICINE

## 2022-09-09 PROCEDURE — 80061 LIPID PANEL: CPT | Performed by: FAMILY MEDICINE

## 2022-09-09 PROCEDURE — 90472 IMMUNIZATION ADMIN EACH ADD: CPT | Performed by: FAMILY MEDICINE

## 2022-09-09 PROCEDURE — 84550 ASSAY OF BLOOD/URIC ACID: CPT | Performed by: FAMILY MEDICINE

## 2022-09-09 PROCEDURE — 99397 PER PM REEVAL EST PAT 65+ YR: CPT | Mod: 25 | Performed by: FAMILY MEDICINE

## 2022-09-09 PROCEDURE — G0103 PSA SCREENING: HCPCS | Performed by: FAMILY MEDICINE

## 2022-09-09 PROCEDURE — 90750 HZV VACC RECOMBINANT IM: CPT | Performed by: FAMILY MEDICINE

## 2022-09-09 PROCEDURE — 36415 COLL VENOUS BLD VENIPUNCTURE: CPT | Performed by: FAMILY MEDICINE

## 2022-09-09 PROCEDURE — 80053 COMPREHEN METABOLIC PANEL: CPT | Performed by: FAMILY MEDICINE

## 2022-09-09 PROCEDURE — 99214 OFFICE O/P EST MOD 30 MIN: CPT | Mod: 25 | Performed by: FAMILY MEDICINE

## 2022-09-09 PROCEDURE — 90715 TDAP VACCINE 7 YRS/> IM: CPT | Performed by: FAMILY MEDICINE

## 2022-09-09 PROCEDURE — 90662 IIV NO PRSV INCREASED AG IM: CPT | Performed by: FAMILY MEDICINE

## 2022-09-09 RX ORDER — MUPIROCIN 20 MG/G
OINTMENT TOPICAL 3 TIMES DAILY
Qty: 30 G | Refills: 0 | Status: SHIPPED | OUTPATIENT
Start: 2022-09-09 | End: 2024-03-29

## 2022-09-09 RX ORDER — HYDROCHLOROTHIAZIDE 12.5 MG/1
TABLET ORAL
Qty: 30 TABLET | Refills: 5 | Status: SHIPPED | OUTPATIENT
Start: 2022-09-09 | End: 2023-03-17

## 2022-09-09 RX ORDER — LISINOPRIL 40 MG/1
40 TABLET ORAL DAILY
Qty: 30 TABLET | Refills: 5 | Status: SHIPPED | OUTPATIENT
Start: 2022-09-09 | End: 2023-03-17

## 2022-09-09 RX ORDER — FENOFIBRATE 54 MG/1
TABLET ORAL
Qty: 30 TABLET | Refills: 5 | Status: SHIPPED | OUTPATIENT
Start: 2022-09-09 | End: 2023-03-16

## 2022-09-09 RX ORDER — METOPROLOL SUCCINATE 100 MG/1
100 TABLET, EXTENDED RELEASE ORAL DAILY
Qty: 30 TABLET | Refills: 5 | Status: SHIPPED | OUTPATIENT
Start: 2022-09-09 | End: 2023-03-21

## 2022-09-09 ASSESSMENT — ENCOUNTER SYMPTOMS
FEVER: 0
ARTHRALGIAS: 0
MYALGIAS: 0
EYE PAIN: 0
HEMATURIA: 0
ABDOMINAL PAIN: 0
COUGH: 0
DYSURIA: 0
CHILLS: 0
SORE THROAT: 0
HEMATOCHEZIA: 0
FREQUENCY: 0
DIARRHEA: 0
HEARTBURN: 0
CONSTIPATION: 0
HEADACHES: 0
JOINT SWELLING: 0
DIZZINESS: 0
NERVOUS/ANXIOUS: 0
PALPITATIONS: 0
NAUSEA: 0
WEAKNESS: 0
SHORTNESS OF BREATH: 0

## 2022-09-09 ASSESSMENT — ACTIVITIES OF DAILY LIVING (ADL): CURRENT_FUNCTION: NO ASSISTANCE NEEDED

## 2022-09-09 ASSESSMENT — PATIENT HEALTH QUESTIONNAIRE - PHQ9
10. IF YOU CHECKED OFF ANY PROBLEMS, HOW DIFFICULT HAVE THESE PROBLEMS MADE IT FOR YOU TO DO YOUR WORK, TAKE CARE OF THINGS AT HOME, OR GET ALONG WITH OTHER PEOPLE: NOT DIFFICULT AT ALL
SUM OF ALL RESPONSES TO PHQ QUESTIONS 1-9: 2
SUM OF ALL RESPONSES TO PHQ QUESTIONS 1-9: 2

## 2022-09-09 ASSESSMENT — PAIN SCALES - GENERAL: PAINLEVEL: SEVERE PAIN (7)

## 2022-09-09 NOTE — PROGRESS NOTES
"SUBJECTIVE:   Darron Schwarz is a 67 year old male who presents for Preventive Visit.      Patient has been advised of split billing requirements and indicates understanding: Yes  Are you in the first 12 months of your Medicare coverage?  No    Healthy Habits:     In general, how would you rate your overall health?  Good    Frequency of exercise:  2-3 days/week    Duration of exercise:  30-45 minutes    Do you usually eat at least 4 servings of fruit and vegetables a day, include whole grains    & fiber and avoid regularly eating high fat or \"junk\" foods?  Yes    Taking medications regularly:  Yes    Medication side effects:  None    Ability to successfully perform activities of daily living:  No assistance needed    Home Safety:  No safety concerns identified    Hearing Impairment:  No hearing concerns    In the past 6 months, have you been bothered by leaking of urine?  No    In general, how would you rate your overall mental or emotional health?  Good      PHQ-2 Total Score: 0    Additional concerns today:  Yes      1) HTN- on beta blocker, diuretic and ACE inhibitor , does not check   2) hypertriglyceridemia , on fenofibrate   3) gout exacerbation   4) sebaceous cyst that popped recently on the left upper shoulder     Do you feel safe in your environment? Yes    Have you ever done Advance Care Planning? (For example, a Health Directive, POLST, or a discussion with a medical provider or your loved ones about your wishes): No, advance care planning information given to patient to review.  Patient declined advance care planning discussion at this time.       Fall risk  Fallen 2 or more times in the past year?: No  Any fall with injury in the past year?: Yes    Cognitive Screening   1) Repeat 3 items (Leader, Season, Table)    2) Clock draw: NORMAL  3) 3 item recall: Recalls 3 objects  Results: 3 items recalled: COGNITIVE IMPAIRMENT LESS LIKELY    Mini-CogTM Copyright S Hellen. Licensed by the author for use in " [facility]; reprinted with permission ([email or web address]@[email or web address]). All rights reserved.      Do you have sleep apnea, excessive snoring or daytime drowsiness?: no    Reviewed and updated as needed this visit by clinical staff                    Reviewed and updated as needed this visit by Provider                   Social History     Tobacco Use     Smoking status: Never Smoker     Smokeless tobacco: Never Used   Substance Use Topics     Alcohol use: Yes     Alcohol/week: 0.0 standard drinks     Comment: 5-6 drinks per week     If you drink alcohol do you typically have >3 drinks per day or >7 drinks per week? No    Alcohol Use 9/5/2022   Prescreen: >3 drinks/day or >7 drinks/week? No   Prescreen: >3 drinks/day or >7 drinks/week? -   No flowsheet data found.        PROBLEMS TO ADD ON...  Right foot big toe, once q 2 yrs   Cyst on back, re appearing again , 2 weeks ago popped     Current providers sharing in care for this patient include:   Patient Care Team:  Eli Sue MD as PCP - General (Family Practice)  Eli Sue MD as Assigned PCP    The following health maintenance items are reviewed in Epic and correct as of today:  Health Maintenance Due   Topic Date Due     ZOSTER IMMUNIZATION (1 of 2) Never done     COLORECTAL CANCER SCREENING  03/15/2020     AORTIC ANEURYSM SCREENING (SYSTEM ASSIGNED)  Never done     PHQ-9  02/10/2022     ANNUAL REVIEW OF HM ORDERS  08/10/2022     INFLUENZA VACCINE (1) 09/01/2022     MEDICARE ANNUAL WELLNESS VISIT  08/10/2022     Pneumococcal Vaccine: 65+ Years (2 - PPSV23 or PCV20) 08/10/2022     Lab work is in process  Labs reviewed in EPIC  BP Readings from Last 3 Encounters:   09/09/22 (!) 191/98   09/05/18 135/85   01/24/18 (!) 155/100    Wt Readings from Last 3 Encounters:   09/09/22 105.9 kg (233 lb 8 oz)   08/10/21 105.7 kg (233 lb)   09/04/18 89.8 kg (198 lb)                  Patient Active Problem List   Diagnosis     Hypertension goal BP (blood pressure) < 140/90      Hyperlipidemia LDL goal <130     Moderate major depression (H)     Adjustment disorder with anxiety     Essential hypertension with goal blood pressure less than 140/90     Moderate episode of recurrent major depressive disorder (H)     Past Surgical History:   Procedure Laterality Date     COLONOSCOPY  03/2009     HC TOOTH EXTRACTION W/FORCEP  age 21    wisdom teeth     ZZC EXCISN COARCT AORTA DRCT ANAST      Hx aortic coaraction repair age 19       Social History     Tobacco Use     Smoking status: Never Smoker     Smokeless tobacco: Never Used   Substance Use Topics     Alcohol use: Yes     Alcohol/week: 0.0 standard drinks     Comment: 5-6 drinks per week     Family History   Problem Relation Age of Onset     Psychotic Disorder Mother      Breast Cancer Mother      Mental Illness Mother         Bipolar disorder     Alcohol/Drug Father      Hypertension Father      Other Cancer Father      Alcohol/Drug Brother      Hypertension Brother      Cardiovascular Sister      Hypertension Sister      Hyperlipidemia Sister      Cardiovascular Brother      Hypertension Sister      Hypertension Brother      Alzheimer Disease Sister          Current Outpatient Medications   Medication Sig Dispense Refill     fenofibrate (LOFIBRA) 54 MG tablet TAKE 1 TABLET(54 MG) BY MOUTH DAILY 30 tablet 5     hydrochlorothiazide (HYDRODIURIL) 12.5 MG tablet Take one pill daily 30 tablet 5     lisinopril (ZESTRIL) 40 MG tablet Take 1 tablet (40 mg) by mouth daily 30 tablet 5     metoprolol succinate ER (TOPROL XL) 100 MG 24 hr tablet Take 1 tablet (100 mg) by mouth daily 30 tablet 5     mupirocin (BACTROBAN) 2 % external ointment Apply topically 3 times daily 30 g 0     VITAMIN C 500 MG PO TABS 1 TABLET DAILY       Allergies   Allergen Reactions     Atorvastatin Calcium      malaise     Recent Labs   Lab Test 08/10/21  0849 08/17/18  0905 06/02/17  0948   LDL 97 74 92   HDL 31* 25* 37*   TRIG 669* 304* 322*   ALT 43 52 41   CR 0.91 0.85 0.96  "  GFRESTIMATED 88 >90 79   GFRESTBLACK  --  >90 >90  African American GFR Calc     POTASSIUM 4.2 4.3 4.7              Review of Systems   Constitutional: Negative for chills and fever.   HENT: Negative for congestion, ear pain, hearing loss and sore throat.    Eyes: Negative for pain and visual disturbance.   Respiratory: Negative for cough and shortness of breath.    Cardiovascular: Negative for chest pain, palpitations and peripheral edema.   Gastrointestinal: Negative for abdominal pain, constipation, diarrhea, heartburn, hematochezia and nausea.   Genitourinary: Negative for dysuria, frequency, genital sores, hematuria, impotence, penile discharge and urgency.   Musculoskeletal: Negative for arthralgias, joint swelling and myalgias.   Skin: Negative for rash.   Neurological: Negative for dizziness, weakness and headaches.   Psychiatric/Behavioral: Negative for mood changes. The patient is not nervous/anxious.      Constitutional, HEENT, cardiovascular, pulmonary, GI, , musculoskeletal, neuro, skin, endocrine and psych systems are negative, except as otherwise noted.    OBJECTIVE:   There were no vitals taken for this visit. Estimated body mass index is 32.5 kg/m  as calculated from the following:    Height as of 8/10/21: 1.803 m (5' 11\").    Weight as of 8/10/21: 105.7 kg (233 lb).  Physical Exam  GENERAL: healthy, alert and no distress  EYES: Eyes grossly normal to inspection, PERRL and conjunctivae and sclerae normal  HENT: ear canals and TM's normal, nose and mouth without ulcers or lesions  NECK: no adenopathy, no asymmetry, masses, or scars and thyroid normal to palpation  RESP: lungs clear to auscultation - no rales, rhonchi or wheezes  CV: regular rate and rhythm, normal S1 S2, no S3 or S4, no murmur, click or rub, no peripheral edema and peripheral pulses strong  ABDOMEN: soft, nontender, no hepatosplenomegaly, no masses and bowel sounds normal  MS: no gross musculoskeletal defects noted, no edema " EXCEPT right foot , big toe edema and some erythema , some edema   SKIN: no suspicious lesions or rashes EXCEPT there is a ruptured cyst on the left upper shoulder with some mild drainage and about 2 cm in size , no erythema   NEURO: Normal strength and tone, mentation intact and speech normal  PSYCH: mentation appears normal, affect normal/bright    Diagnostic Test Results:  Labs reviewed in Epic  No results found for this or any previous visit (from the past 24 hour(s)).    ASSESSMENT / PLAN:   (Z00.00) Encounter for routine adult health examination without abnormal findings  (primary encounter diagnosis)  Comment: screening labs today   Plan: REVIEW OF HEALTH MAINTENANCE PROTOCOL ORDERS,         PSA, screen        As above     (I10) Essential hypertension with goal blood pressure less than 140/90  Comment: will need to check his BP at home , if above 140 over 90 , will let me know so that we can adjust medications   Plan: lisinopril (ZESTRIL) 40 MG tablet, metoprolol         succinate ER (TOPROL XL) 100 MG 24 hr tablet,         hydrochlorothiazide (HYDRODIURIL) 12.5 MG         tablet        Could increase the dose of the metoprolol as his HR is elevated today but he will need to keep a log book about BP and HR values     (E78.1) Hypertriglyceridemia  Comment: fasting for lipids check , he is on fenofibrate   Plan: Lipid panel reflex to direct LDL Fasting,         Comprehensive metabolic panel (BMP + Alb, Alk         Phos, ALT, AST, Total. Bili, TP), fenofibrate         (LOFIBRA) 54 MG tablet            (F32.1) Moderate major depression (H)  Comment: is stable currently   Plan: not on medications     (Z12.11) Screen for colon cancer  Comment: referred for colonoscopy   Plan: Colonoscopy Screening  Referral            (Z23) Encounter for immunization  Comment: got his tetanus booster and also flu shot today , he will return in 2 weeks for the second pneumonia shot and new Covid booster   Plan: TDAP VACCINE  "(Adacel, Boostrix)  [[id number]],         INFLUENZA, QUAD, HD, PF, 65+ (FLUZONE HD),         ZOSTER VACCINE RECOMBINANT ADJUVANTED IM NJX        As above     (R73.01) Elevated fasting blood sugar  Comment: will screen   Plan: Hemoglobin A1c            (M10.9) Acute gout involving toe of right foot, unspecified cause  Comment: hx of gout but on the other foot in the past , he has only used NSAIDs with the exacerbations , will check uric acid today   Plan: Uric acid            (L72.3) Sebaceous cyst  Comment: discussed surgery referral for the cyst capsule removal, also since it has ruptured currently and very midl oozing not  A lot of erythema , I have Rx some topical Bactroban and gauze over it , change dressings daily   Plan: Adult General Surg Referral, mupirocin         (BACTROBAN) 2 % external ointment        RTC if no improving or worsening.  Pt is aware  and comfortable with the current plan.        Patient has been advised of split billing requirements and indicates understanding: Yes    COUNSELING:  Reviewed preventive health counseling, as reflected in patient instructions       Regular exercise       Healthy diet/nutrition       Vision screening       Hearing screening       Dental care    Estimated body mass index is 32.5 kg/m  as calculated from the following:    Height as of 8/10/21: 1.803 m (5' 11\").    Weight as of 8/10/21: 105.7 kg (233 lb).        He reports that he has never smoked. He has never used smokeless tobacco.      Appropriate preventive services were discussed with this patient, including applicable screening as appropriate for cardiovascular disease, diabetes, osteopenia/osteoporosis, and glaucoma.  As appropriate for age/gender, discussed screening for colorectal cancer, prostate cancer, breast cancer, and cervical cancer. Checklist reviewing preventive services available has been given to the patient.    Reviewed patients plan of care and provided an AVS. The Intermediate Care Plan ( " asthma action plan, low back pain action plan, and migraine action plan) for Darron meets the Care Plan requirement. This Care Plan has been established and reviewed with the Patient.    Counseling Resources:  ATP IV Guidelines  Pooled Cohorts Equation Calculator  Breast Cancer Risk Calculator  Breast Cancer: Medication to Reduce Risk  FRAX Risk Assessment  ICSI Preventive Guidelines  Dietary Guidelines for Americans, 2010  [employer]'s MyPlate  ASA Prophylaxis  Lung CA Screening    Eli Sue MD  [facility]    Identified Health Risks:

## 2022-09-10 LAB
ALBUMIN SERPL-MCNC: 3.7 G/DL (ref 3.4–5)
ALP SERPL-CCNC: 46 U/L (ref 40–150)
ALT SERPL W P-5'-P-CCNC: 46 U/L (ref 0–70)
ANION GAP SERPL CALCULATED.3IONS-SCNC: 10 MMOL/L (ref 3–14)
AST SERPL W P-5'-P-CCNC: 27 U/L (ref 0–45)
BILIRUB SERPL-MCNC: 0.6 MG/DL (ref 0.2–1.3)
BUN SERPL-MCNC: 16 MG/DL (ref 7–30)
CALCIUM SERPL-MCNC: 8.8 MG/DL (ref 8.5–10.1)
CHLORIDE BLD-SCNC: 107 MMOL/L (ref 94–109)
CHOLEST SERPL-MCNC: 200 MG/DL
CO2 SERPL-SCNC: 21 MMOL/L (ref 20–32)
CREAT SERPL-MCNC: 0.88 MG/DL (ref 0.66–1.25)
FASTING STATUS PATIENT QL REPORTED: YES
GFR SERPL CREATININE-BSD FRML MDRD: >90 ML/MIN/1.73M2
GLUCOSE BLD-MCNC: 116 MG/DL (ref 70–99)
HDLC SERPL-MCNC: 28 MG/DL
LDLC SERPL CALC-MCNC: 121 MG/DL
NONHDLC SERPL-MCNC: 172 MG/DL
POTASSIUM BLD-SCNC: 3.8 MMOL/L (ref 3.4–5.3)
PROT SERPL-MCNC: 7.2 G/DL (ref 6.8–8.8)
PSA SERPL-MCNC: 0.81 UG/L (ref 0–4)
SODIUM SERPL-SCNC: 138 MMOL/L (ref 133–144)
TRIGL SERPL-MCNC: 254 MG/DL
URATE SERPL-MCNC: 6.2 MG/DL (ref 3.5–7.2)

## 2022-09-11 VITALS
RESPIRATION RATE: 16 BRPM | BODY MASS INDEX: 32.69 KG/M2 | OXYGEN SATURATION: 97 % | HEIGHT: 71 IN | WEIGHT: 233.5 LBS | HEART RATE: 81 BPM | DIASTOLIC BLOOD PRESSURE: 80 MMHG | SYSTOLIC BLOOD PRESSURE: 138 MMHG | TEMPERATURE: 97.8 F

## 2022-09-15 ENCOUNTER — TELEPHONE (OUTPATIENT)
Dept: GASTROENTEROLOGY | Facility: CLINIC | Age: 67
End: 2022-09-15

## 2022-09-15 NOTE — TELEPHONE ENCOUNTER
Screening Questions    BlueKIND OF PREP RedLOCATION [review exclusion criteria] GreenSEDATION TYPE      1. Are you active on mychart? Yes    2. What insurance is in the chart? [facility]     3.   Ordering/Referring Provider: Vikram      4. BMI   (If greater than 40 review exclusion criteria [PAC APPT IF [MAC] @ [facility])  32.57  [If yes, BMI OVER 40-EXTENDED PREP]      **(Sedation review/consideration needed)**  Do you have a legal guardian or Medical Power of    and/or are you able to give consent for your medical care?     Yes    5. Have you had a positive covid test in the last 90 days?   N - NA    6.  Are you currently on dialysis?   N [ If yes, G-PREP & HOSPITAL setting ONLY]     7.  Do you have chronic kidney disease?  N [ If yes, G-PREP ]    8.   Do you have a diagnosis of diabetes?   N   [ If yes, G-PREP ]    9.  On a regular basis do you go 3-5 days between bowel movements?   N   [ If yes, EXTENDED PREP]    10.  Are you taking any prescription pain medications on a routine schedule?    N - NA [ If yes, EXTENDED PREP] [If yes, MAC]      11.   Do you have any chemical dependencies such as alcohol, street drugs, or methadone?    N [If yes, MAC]    12.   Do you have any history of post-traumatic stress syndrome, severe anxiety or history of psychosis?    N  [If yes, MAC]    13.  [FEMALES] Are you currently pregnant? NA    If yes, how many weeks?       Respiratory/Heart Screening:  [If yes to any of the following HOSPITAL setting only]     14. Do you have Pulmonary Hypertension [Lungs]?   N       15. Do you have UNCONTROLLED asthma?   N     16.  Do you use daily home oxygen?  N      17. Do you have mod to severe Obstructive Sleep Apnea?         (OKAY @  [facility]  SH  PH  RI  MG - if pt is not on OXYGEN)  N      18.   Have you had a heart or lung transplant?   N      19.   Have you had a stroke or Transient ischemic attack (TIA - aka  mini stroke ) within 6 months?  (If yes, please review exclusion criteria)  N      20.   In the past 6 months, have you had any heart related issues including cardiomyopathy or heart attack?   N           If yes, did it require cardiac stenting or other implantable device?   N      21.   Do you have any implantable devices in your body (pacemaker, defib, LVAD)? (If yes, please review exclusion criteria)  N   22.  Do you take the medication Phentermine?  NO        23. Do you take nitroglycerin?   N           If yes, how often? NA  (if yes, HOSPITAL setting ONLY)    24.  Are you currently taking any blood thinners?    [If yes, [facility] FOR BRIDG[facility] IN[facility]CTIONS]     N    25.   Do you transfer independently?                (If NO, please HOSPITAL setting ONLY)  Yes    26.   Preferred LOCAL Pharmacy for Pre Prescription:         [facility][id number] [address] AT [address] [place] [address] & [facility]    Scheduling Details  (Please ask for phone number if not scheduled by patient)      Caller : Kalpesh  Date of Procedure: 10/19/22  Surgeon: Manfred  Location:         Sedation Type: CS l   Conscious Sedation- Needs  for 6 hours after the procedure  MAC/General-Needs  for 24 hours after procedure    NA :[Pre-op Required] at [facility]  MG and OR for MAC sedation   (advise patient they will need a pre-op WITH IN 30 DAYS of procedure date)     Type of Procedure Scheduled:   Lower Endoscopy [Colonoscopy]    Which Colonoscopy Prep was Sent?:   GoLytely due to magnesium citrate recall -     KHORUTS CF PATIENTS & GROEN'S PATIENTS NEEDS EXTENDED PREP       Informed patient they will need an adult  Yes  Cannot take any type of public or medical transportation alone    Pre-Procedure Covid test to be completed at [facility] or Externally: HOME  **INFORMED OF HOME TESTING & LAB OPTION**        Confirmed Nurse will call to complete assessment Yes    Additional comments:

## 2022-10-19 ENCOUNTER — HOSPITAL ENCOUNTER (OUTPATIENT)
Facility: CLINIC | Age: 67
Discharge: HOME | End: 2022-10-19
Attending: SPECIALIST | Admitting: SPECIALIST
Payer: COMMERCIAL

## 2022-10-19 VITALS
OXYGEN SATURATION: 99 % | DIASTOLIC BLOOD PRESSURE: 117 MMHG | RESPIRATION RATE: 9 BRPM | HEART RATE: 81 BPM | SYSTOLIC BLOOD PRESSURE: 160 MMHG

## 2022-10-19 DIAGNOSIS — Z12.11 ENCOUNTER FOR SCREENING COLONOSCOPY: Primary | ICD-10-CM

## 2022-10-19 LAB — COLONOSCOPY: NORMAL

## 2022-10-19 PROCEDURE — 45378 DIAGNOSTIC COLONOSCOPY: CPT | Performed by: SPECIALIST

## 2022-10-19 PROCEDURE — G0500 MOD SEDAT ENDO SERVICE >5YRS: HCPCS | Performed by: SPECIALIST

## 2022-10-19 PROCEDURE — G0121 COLON CA SCRN NOT HI RSK IND: HCPCS | Performed by: SPECIALIST

## 2022-10-19 PROCEDURE — 250N000011 HC RX IP 250 OP 636: Performed by: SPECIALIST

## 2022-10-19 RX ORDER — ONDANSETRON 2 MG/ML
4 INJECTION INTRAMUSCULAR; INTRAVENOUS
Status: DISCONTINUED | OUTPATIENT
Start: 2022-10-19 | End: 2022-10-19 | Stop reason: HOSPADM

## 2022-10-19 RX ORDER — FENTANYL CITRATE 50 UG/ML
INJECTION, SOLUTION INTRAMUSCULAR; INTRAVENOUS PRN
Status: DISCONTINUED | OUTPATIENT
Start: 2022-10-19 | End: 2022-10-19 | Stop reason: HOSPADM

## 2022-10-19 RX ORDER — LIDOCAINE 40 MG/G
CREAM TOPICAL
Status: DISCONTINUED | OUTPATIENT
Start: 2022-10-19 | End: 2022-10-19 | Stop reason: HOSPADM

## 2022-10-19 ASSESSMENT — ACTIVITIES OF DAILY LIVING (ADL): ADLS_ACUITY_SCORE: 33

## 2022-10-19 NOTE — H&P
Pre-Endoscopy History and Physical     Darron Schwarz MRN# [id number]   YOB: 1955 Age: 67 year old     Date of Procedure: 10/19/2022  Primary care provider: Eli Sue  Type of Endoscopy: colonoscopy  Reason for Procedure: screening  Type of Anesthesia Anticipated: Moderate sedation    HPI:    Darron is a 67 year old male who will be undergoing the above procedure.      A history and physical has been performed. The patient's medications and allergies have been reviewed. The risks and benefits of the procedure and the sedation options and risks were discussed with the patient.  All questions were answered and informed consent was obtained.      He denies a personal or family history of anesthesia complications or bleeding disorders.     Allergies   Allergen Reactions     Atorvastatin Calcium      malaise        No current facility-administered medications for this encounter.       Patient Active Problem List   Diagnosis     Hypertension goal BP (blood pressure) < 140/90     Hyperlipidemia LDL goal <130     Moderate major depression (H)     Adjustment disorder with anxiety     Essential hypertension with goal blood pressure less than 140/90     Moderate episode of recurrent major depressive disorder (H)        Past Medical History:   Diagnosis Date     Anxiety state, unspecified      Depressive disorder 1/2012    Related to ending of relationship.  No longer taking meds     Other and unspecified hyperlipidemia      Unspecified essential hypertension      Varicella without mention of complication         Past Surgical History:   Procedure Laterality Date     COLONOSCOPY  03/2009     HC TOOTH EXTRACTION W/FORCEP  age 21    wisdom teeth     ZZC EXCISN COARCT AORTA DRCT ANAST      Hx aortic coaraction repair age 19       Social History     Tobacco Use     Smoking status: Never     Smokeless tobacco: Never   Substance Use Topics     Alcohol use: Yes     Alcohol/week: 0.0 standard drinks     Comment:  "5-6 drinks per week       Family History   Problem Relation Age of Onset     Psychotic Disorder Mother      Breast Cancer Mother      Mental Illness Mother         Bipolar disorder     Alcohol/Drug Father      Hypertension Father      Other Cancer Father      Alcohol/Drug Brother      Hypertension Brother      Cardiovascular Sister      Hypertension Sister      Hyperlipidemia Sister      Cardiovascular Brother      Hypertension Sister      Hypertension Brother      Alzheimer Disease Sister        REVIEW OF SYSTEMS:   5 point ROS negative except as noted above in HPI, including Gen., Resp., CV, GI &  system review.    PHYSICAL EXAM:   BP (!) 168/85   Pulse 81   Resp 18   SpO2 99%  Estimated body mass index is 32.57 kg/m  as calculated from the following:    Height as of 9/9/22: 1.803 m (5' 11\").    Weight as of 9/9/22: 105.9 kg (233 lb 8 oz).   GENERAL APPEARANCE: healthy  MENTAL STATUS: alert  AIRWAY EXAM: Mallampatti Class II (visualization of the soft palate, fauces, and uvula)  RESP: lungs clear to auscultation - no rales, rhonchi or wheezes  CV: regular rates and rhythm, normal S1 S2, no S3 or S4 and no murmur, click or rub    DIAGNOSTICS:    Not indicated    IMPRESSION   ASA Class 2 - Mild systemic disease    PLAN:   colonoscopy    The above has been forwarded to the consulting provider.      Signed Electronically by: Bruce Shearer MD  October 19, 2022        "

## 2023-03-14 DIAGNOSIS — E78.1 HYPERTRIGLYCERIDEMIA: ICD-10-CM

## 2023-03-14 DIAGNOSIS — I10 ESSENTIAL HYPERTENSION WITH GOAL BLOOD PRESSURE LESS THAN 140/90: ICD-10-CM

## 2023-03-16 RX ORDER — FENOFIBRATE 54 MG/1
TABLET ORAL
Qty: 30 TABLET | Refills: 5 | Status: SHIPPED | OUTPATIENT
Start: 2023-03-16 | End: 2023-06-30

## 2023-03-16 NOTE — TELEPHONE ENCOUNTER
Patient due for follow up  [employer] message sent    Rosendo RODRIGUEZ RN   [facility]

## 2023-03-16 NOTE — TELEPHONE ENCOUNTER
"Requested Prescriptions   Pending Prescriptions Disp Refills     fenofibrate (LOFIBRA) 54 MG tablet 30 tablet 5     Sig: TAKE 1 TABLET(54 MG) BY MOUTH DAILY       Fibrates Passed - 3/14/2023 12:39 PM        Passed - Lipid panel on file in past 12 months     Recent Labs   Lab Test 09/09/22  1459   CHOL 200*   TRIG 254*   HDL 28*   *   NHDL 172*               Passed - No abnormal creatine kinase in past 12 months     No lab results found.             Passed - Recent (12 mo) or future (30 days) visit within the authorizing provider's specialty     Patient has had an office visit with the authorizing provider or a provider within the authorizing providers department within the previous 12 mos or has a future within next 30 days. See \"Patient Info\" tab in inbasket, or \"Choose Columns\" in Meds & Orders section of the refill encounter.              Passed - Medication is active on med list        Passed - Patient is age 18 or older           Prescription approved per [facility] Refill Protocol.    Sarita Lu RN    "

## 2023-03-17 RX ORDER — LISINOPRIL 40 MG/1
40 TABLET ORAL DAILY
Qty: 30 TABLET | Refills: 0 | Status: SHIPPED | OUTPATIENT
Start: 2023-03-17 | End: 2023-06-30

## 2023-03-17 RX ORDER — HYDROCHLOROTHIAZIDE 12.5 MG/1
TABLET ORAL
Qty: 30 TABLET | Refills: 0 | Status: SHIPPED | OUTPATIENT
Start: 2023-03-17 | End: 2023-04-18

## 2023-03-17 NOTE — TELEPHONE ENCOUNTER
Medication is being filled for 1 time refill only due to:  Patient needs to be seen because due for BP follow up.     Note from 9/9/22 OV:   Return in about 4 weeks (around 10/7/2022) for BP Recheck.      Sarah Randolph RN

## 2023-03-21 RX ORDER — METOPROLOL SUCCINATE 100 MG/1
100 TABLET, EXTENDED RELEASE ORAL DAILY
Qty: 30 TABLET | Refills: 0 | Status: SHIPPED | OUTPATIENT
Start: 2023-03-21 | End: 2023-04-18

## 2023-03-21 NOTE — TELEPHONE ENCOUNTER
Routing refill request to provider for review/approval because:  Request one week old.  No response to outreach.  Please advise.  Ami LICONA RN

## 2023-03-21 NOTE — TELEPHONE ENCOUNTER
Patient has not read Gema message.  VM left asking patient to call back.    Sarah Randolph RN

## 2023-04-14 DIAGNOSIS — I10 ESSENTIAL HYPERTENSION WITH GOAL BLOOD PRESSURE LESS THAN 140/90: ICD-10-CM

## 2023-04-14 NOTE — TELEPHONE ENCOUNTER
Last visit note from 09/09/2022  Return in about 4 weeks (around 10/7/2022) for BP Recheck.    Called and left patient a voicemail   To call the clinic and schedule a follow up  Waiting call back at this time      Rosendo RODRIGUEZ RN   [facility]

## 2023-04-17 DIAGNOSIS — I10 ESSENTIAL HYPERTENSION WITH GOAL BLOOD PRESSURE LESS THAN 140/90: ICD-10-CM

## 2023-04-18 RX ORDER — HYDROCHLOROTHIAZIDE 12.5 MG/1
TABLET ORAL
Qty: 30 TABLET | Refills: 0 | Status: SHIPPED | OUTPATIENT
Start: 2023-04-18 | End: 2023-05-16

## 2023-04-18 NOTE — TELEPHONE ENCOUNTER
Routing refill request to provider for review/approval because:  Please see below and advise.  Ami LICONA RN

## 2023-04-18 NOTE — TELEPHONE ENCOUNTER
Left message for patient to call [facility] back  When patient calls back please transfer to an RN    Rosendo RODRIGUEZ RN   [facility]

## 2023-04-21 RX ORDER — METOPROLOL SUCCINATE 100 MG/1
100 TABLET, EXTENDED RELEASE ORAL DAILY
Qty: 30 TABLET | Refills: 0 | Status: SHIPPED | OUTPATIENT
Start: 2023-04-21 | End: 2023-06-30

## 2023-04-21 NOTE — TELEPHONE ENCOUNTER
Routing refill request to provider for review/approval because:  Patient due for follow-up.  No response to outreach.  Request one week old.  Ami LICONA RN

## 2023-05-15 DIAGNOSIS — I10 ESSENTIAL HYPERTENSION WITH GOAL BLOOD PRESSURE LESS THAN 140/90: ICD-10-CM

## 2023-05-16 RX ORDER — HYDROCHLOROTHIAZIDE 12.5 MG/1
TABLET ORAL
Qty: 30 TABLET | Refills: 0 | Status: SHIPPED | OUTPATIENT
Start: 2023-05-16 | End: 2023-06-13

## 2023-05-16 NOTE — TELEPHONE ENCOUNTER
Prescription approved per [facility] Refill Protocol.      Rosendo RODRIGUEZ RN   [facility]

## 2023-05-23 DIAGNOSIS — I10 ESSENTIAL HYPERTENSION WITH GOAL BLOOD PRESSURE LESS THAN 140/90: ICD-10-CM

## 2023-05-23 RX ORDER — METOPROLOL SUCCINATE 100 MG/1
100 TABLET, EXTENDED RELEASE ORAL DAILY
Qty: 30 TABLET | Refills: 0 | Status: CANCELLED | OUTPATIENT
Start: 2023-05-23

## 2023-06-13 DIAGNOSIS — I10 ESSENTIAL HYPERTENSION WITH GOAL BLOOD PRESSURE LESS THAN 140/90: ICD-10-CM

## 2023-06-13 RX ORDER — HYDROCHLOROTHIAZIDE 12.5 MG/1
TABLET ORAL
Qty: 30 TABLET | Refills: 0 | Status: SHIPPED | OUTPATIENT
Start: 2023-06-13 | End: 2023-06-30

## 2023-06-13 NOTE — TELEPHONE ENCOUNTER
Routing refill request to provider for review/approval because:  Attempted to call patient, mailbox is full.  Has not read [employer] message.  Please advise on refill.  Ami LICONA RN

## 2023-06-27 ENCOUNTER — OFFICE VISIT (OUTPATIENT)
Dept: URGENT CARE | Facility: URGENT CARE | Age: 68
End: 2023-06-27
Payer: COMMERCIAL

## 2023-06-27 VITALS
BODY MASS INDEX: 31.15 KG/M2 | SYSTOLIC BLOOD PRESSURE: 154 MMHG | HEIGHT: 72 IN | OXYGEN SATURATION: 97 % | HEART RATE: 82 BPM | TEMPERATURE: 97.8 F | WEIGHT: 230 LBS | DIASTOLIC BLOOD PRESSURE: 90 MMHG

## 2023-06-27 DIAGNOSIS — S40.861A INSECT BITE OF RIGHT UPPER EXTREMITY, INITIAL ENCOUNTER: ICD-10-CM

## 2023-06-27 DIAGNOSIS — R23.8 BULLAE: Primary | ICD-10-CM
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
## 2023-06-27 PROCEDURE — 99213 OFFICE O/P EST LOW 20 MIN: CPT | Performed by: NURSE PRACTITIONER

## 2023-06-28 ENCOUNTER — ALLIED HEALTH/NURSE VISIT (OUTPATIENT)
Dept: FAMILY MEDICINE | Facility: CLINIC | Age: 68
End: 2023-06-28
Payer: COMMERCIAL

## 2023-06-28 DIAGNOSIS — Z23 NEED FOR SHINGLES VACCINE: ICD-10-CM

## 2023-06-28 DIAGNOSIS — Z23 ENCOUNTER FOR IMMUNIZATION: Primary | ICD-10-CM

## 2023-06-28 PROCEDURE — 90677 PCV20 VACCINE IM: CPT

## 2023-06-28 PROCEDURE — 99207 PR NO CHARGE NURSE ONLY: CPT

## 2023-06-28 PROCEDURE — 0124A COVID-19 BIVALENT 12+ (PFIZER): CPT

## 2023-06-28 PROCEDURE — 90750 HZV VACC RECOMBINANT IM: CPT

## 2023-06-28 PROCEDURE — 90472 IMMUNIZATION ADMIN EACH ADD: CPT

## 2023-06-28 PROCEDURE — 90471 IMMUNIZATION ADMIN: CPT

## 2023-06-28 PROCEDURE — 91312 COVID-19 BIVALENT 12+ (PFIZER): CPT

## 2023-06-28 NOTE — PROGRESS NOTES
Chief Complaint   Patient presents with     Urgent Care     Pt in clinic to have eval for possible bug bite on right lower arm/wrist .     Insect Bite     SUBJECTIVE:  Darron Schwarz is a 67 year old male who presents to the clinic today with bug bites to his bilateral forearms that occurred over the weekend.  He noticed the right has become a 4 cm bullae blister filled with serous fluid over the last couple days.  It started off as a welt with 3 small raised dots itching and burning.  There is no profound redness warmth fevers pus tenderness.  He is not diabetic and declines excess sun exposure.  It is possible that he was exposed to poison ivy or sumac.  He was drinking some alcohol over the weekend.    Past Medical History:   Diagnosis Date     Anxiety state, unspecified      Depressive disorder 1/2012    Related to ending of relationship.  No longer taking meds     Other and unspecified hyperlipidemia      Unspecified essential hypertension      Varicella without mention of complication      fenofibrate (LOFIBRA) 54 MG tablet, TAKE 1 TABLET(54 MG) BY MOUTH DAILY  hydrochlorothiazide (HYDRODIURIL) 12.5 MG tablet, Take one pill daily  metoprolol succinate ER (TOPROL XL) 100 MG 24 hr tablet, Take 1 tablet (100 mg) by mouth daily  VITAMIN C 500 MG PO TABS, 1 TABLET DAILY  lisinopril (ZESTRIL) 40 MG tablet, Take 1 tablet (40 mg) by mouth daily (Patient not taking: Reported on 6/27/2023)  mupirocin (BACTROBAN) 2 % external ointment, Apply topically 3 times daily (Patient not taking: Reported on 6/27/2023)    No current facility-administered medications on file prior to visit.    Social History     Tobacco Use     Smoking status: Never     Smokeless tobacco: Never   Substance Use Topics     Alcohol use: Yes     Alcohol/week: 0.0 standard drinks of alcohol     Comment: 5-6 drinks per week     Allergies   Allergen Reactions     Atorvastatin Calcium      malaise       Review of Systems   All systems negative except for  "those listed above in HPI.    EXAM:   BP (!) 154/90   Pulse 82   Temp 97.8  F (36.6  C) (Temporal)   Ht 1.816 m (5' 11.5\")   Wt 104.3 kg (230 lb)   SpO2 97%   BMI 31.63 kg/m      Physical Exam  Vitals reviewed.   Constitutional:       Appearance: Normal appearance.   HENT:      Head: Normocephalic and atraumatic.      Nose: Nose normal.      Mouth/Throat:      Mouth: Mucous membranes are moist.      Pharynx: Oropharynx is clear.   Eyes:      Extraocular Movements: Extraocular movements intact.      Conjunctiva/sclera: Conjunctivae normal.      Pupils: Pupils are equal, round, and reactive to light.   Cardiovascular:      Rate and Rhythm: Normal rate.      Pulses: Normal pulses.   Pulmonary:      Effort: Pulmonary effort is normal.   Musculoskeletal:         General: Normal range of motion.      Cervical back: Normal range of motion and neck supple.   Skin:     General: Skin is warm and dry.      Findings: Lesion present.   Neurological:      General: No focal deficit present.      Mental Status: He is alert and oriented to person, place, and time.   Psychiatric:         Mood and Affect: Mood normal.         Behavior: Behavior normal.       ASSESSMENT:    ICD-10-CM    1. Bullae  R23.8       2. Insect bite of right upper extremity, initial encounter  S40.861A     W57.XXXA         PLAN:  Most likely a large local reaction bullae due to bug bite or plant oil hypersensitivity  Shared decision making to hold on oral prednisone  Over the counter 1% hydrocortisone cream as needed 2x a day as needed  Zyrtec, allegra or claratin once a day for at least 3 days  Cool compresses/ice packs  Large local reactions to insect bites typically consist of an itchy or even painful area of redness, warmth, swelling and/or induration that ranges from a few cm to more than 10 cm in diameter. Large local reactions develop within hours of the bite, progress over 8 to 12 hours or more, and resolve within 3 to 10 days.  Watch for any high " fevers, open areas of skin, pus/discharge presence. If these occur, be seen for reevaluation.  Please follow up with primary care provider if not improving, worsening or new symptoms or for any adverse reactions to medications.    DEB Fletcher-BC  [facility]

## 2023-06-28 NOTE — PATIENT INSTRUCTIONS
Most likely a large local reaction bullae due to bug bite or plant oil hypersensitivity  Shared decision making to hold on oral prednisone  Over the counter 1% hydrocortisone cream as needed 2x a day as needed  Zyrtec, allegra or claratin once a day for at least 3 days  Cool compresses/ice packs  Large local reactions to insect bites typically consist of an itchy or even painful area of redness, warmth, swelling and/or induration that ranges from a few cm to more than 10 cm in diameter. Large local reactions develop within hours of the bite, progress over 8 to 12 hours or more, and resolve within 3 to 10 days.  Watch for any high fevers, open areas of skin, pus/discharge presence. If these occur, be seen for reevaluation.  Please follow up with primary care provider if not improving, worsening or new symptoms or for any adverse reactions to medications.

## 2023-06-28 NOTE — PROGRESS NOTES
Prior to immunization administration, verified patients identity using patient s name and date of birth. Please see Immunization Activity for additional information.     Screening Questionnaire for Adult Immunization    Are you sick today?   No   Do you have allergies to medications, food, a vaccine component or latex?   No   Have you ever had a serious reaction after receiving a vaccination?   No   Do you have a long-term health problem with heart, lung, kidney, or metabolic disease (e.g., diabetes), asthma, a blood disorder, no spleen, complement component deficiency, a cochlear implant, or a spinal fluid leak?  Are you on long-term aspirin therapy?   No   Do you have cancer, leukemia, HIV/AIDS, or any other immune system problem?   No   Do you have a parent, brother, or sister with an immune system problem?   No   In the past 3 months, have you taken medications that affect  your immune system, such as prednisone, other steroids, or anticancer drugs; drugs for the treatment of rheumatoid arthritis, Crohn s disease, or psoriasis; or have you had radiation treatments?   No   Have you had a seizure, or a brain or other nervous system problem?   No   During the past year, have you received a transfusion of blood or blood    products, or been given immune (gamma) globulin or antiviral drug?   No   For women: Are you pregnant or is there a chance you could become       pregnant during the next month?   No   Have you received any vaccinations in the past 4 weeks?   No     Immunization questionnaire answers were all negative.    I have reviewed the following standing orders:   This patient is due and qualifies for the Covid-19 vaccine.     Click here for COVID-19 Standing Order    I have reviewed the vaccines inclusion and exclusion criteria; No concerns regarding eligibility.   This patient is due and qualifies for the Pneumococcal vaccine.    Click here for Pneumococcal (Adult) Standing Order    I have reviewed the  vaccines inclusion and exclusion criteria;No concerns regarding eligibility.         This patient is due and qualifies for the Zoster vaccine.    Click here for Zoster Standing Order    I have reviewed the vaccines inclusion and exclusion criteria; No concerns regarding eligibility.         Patient instructed to remain in clinic for 15 minutes afterwards, and to report any adverse reactions.     Screening performed by Chayo Panyda on 6/28/2023 at 1:20 PM.

## 2023-06-29 ASSESSMENT — ANXIETY QUESTIONNAIRES
2. NOT BEING ABLE TO STOP OR CONTROL WORRYING: NOT AT ALL
6. BECOMING EASILY ANNOYED OR IRRITABLE: NOT AT ALL
7. FEELING AFRAID AS IF SOMETHING AWFUL MIGHT HAPPEN: NOT AT ALL
GAD7 TOTAL SCORE: 0
1. FEELING NERVOUS, ANXIOUS, OR ON EDGE: NOT AT ALL
4. TROUBLE RELAXING: NOT AT ALL
5. BEING SO RESTLESS THAT IT IS HARD TO SIT STILL: NOT AT ALL
GAD7 TOTAL SCORE: 0
3. WORRYING TOO MUCH ABOUT DIFFERENT THINGS: NOT AT ALL

## 2023-06-29 ASSESSMENT — PATIENT HEALTH QUESTIONNAIRE - PHQ9
SUM OF ALL RESPONSES TO PHQ QUESTIONS 1-9: 0
SUM OF ALL RESPONSES TO PHQ QUESTIONS 1-9: 0

## 2023-06-30 ENCOUNTER — VIRTUAL VISIT (OUTPATIENT)
Dept: FAMILY MEDICINE | Facility: CLINIC | Age: 68
End: 2023-06-30
Payer: COMMERCIAL

## 2023-06-30 DIAGNOSIS — E78.1 HYPERTRIGLYCERIDEMIA: ICD-10-CM

## 2023-06-30 DIAGNOSIS — F32.1 MODERATE MAJOR DEPRESSION (H): ICD-10-CM

## 2023-06-30 DIAGNOSIS — I10 ESSENTIAL HYPERTENSION WITH GOAL BLOOD PRESSURE LESS THAN 140/90: Primary | ICD-10-CM

## 2023-06-30 DIAGNOSIS — M10.9 ACUTE GOUT OF RIGHT FOOT, UNSPECIFIED CAUSE: ICD-10-CM

## 2023-06-30 PROCEDURE — 99214 OFFICE O/P EST MOD 30 MIN: CPT | Mod: GT | Performed by: FAMILY MEDICINE

## 2023-06-30 RX ORDER — LISINOPRIL 40 MG/1
40 TABLET ORAL DAILY
Qty: 30 TABLET | Refills: 5 | Status: SHIPPED | OUTPATIENT
Start: 2023-06-30 | End: 2023-12-22

## 2023-06-30 RX ORDER — FENOFIBRATE 54 MG/1
TABLET ORAL
Qty: 30 TABLET | Refills: 5 | Status: SHIPPED | OUTPATIENT
Start: 2023-06-30 | End: 2024-03-29

## 2023-06-30 RX ORDER — METOPROLOL SUCCINATE 100 MG/1
100 TABLET, EXTENDED RELEASE ORAL DAILY
Qty: 30 TABLET | Refills: 5 | Status: SHIPPED | OUTPATIENT
Start: 2023-06-30 | End: 2024-01-04

## 2023-06-30 RX ORDER — HYDROCHLOROTHIAZIDE 12.5 MG/1
TABLET ORAL
Qty: 30 TABLET | Refills: 0 | Status: SHIPPED | OUTPATIENT
Start: 2023-06-30 | End: 2023-07-13

## 2023-06-30 ASSESSMENT — ANXIETY QUESTIONNAIRES: GAD7 TOTAL SCORE: 0

## 2023-06-30 ASSESSMENT — PATIENT HEALTH QUESTIONNAIRE - PHQ9: SUM OF ALL RESPONSES TO PHQ QUESTIONS 1-9: 0

## 2023-06-30 NOTE — PROGRESS NOTES
"Kalpesh is a 67 year old who is being evaluated via a billable telephone visit.      What phone number would you like to be contacted at? [phone]   How would you like to obtain your AVS? Tonio  Distant Location (provider location):  On-site    Assessment & Plan     Essential hypertension with goal blood pressure less than 140/90  He is on the hydrochlorothiazide , lisinopril and metoprolol and it is controlling his BP well , no side effects   - lisinopril (ZESTRIL) 40 MG tablet; Take 1 tablet (40 mg) by mouth daily  - metoprolol succinate ER (TOPROL XL) 100 MG 24 hr tablet; Take 1 tablet (100 mg) by mouth daily  - hydrochlorothiazide (HYDRODIURIL) 12.5 MG tablet; Take one pill daily  Refilled     Moderate major depression (H)  Is stable currently     Acute gout of right foot, unspecified cause  Has had exacerbations which he treated with NSAIDS , but he has not been on Allupurinol   Referred tp rheumatology   - Adult Rheumatology  Referral; Future    Hypertriglyceridemia  On fenofibrate , rfilled   - fenofibrate (LOFIBRA) 54 MG tablet; TAKE 1 TABLET(54 MG) BY MOUTH DAILY  Will need to follow up in the office in the fall for fasting labs and CMP check              BMI:   Estimated body mass index is 31.63 kg/m  as calculated from the following:    Height as of 6/27/23: 1.816 m (5' 11.5\").    Weight as of 6/27/23: 104.3 kg (230 lb).       Follow up in three months sooner if any concerns , Pt is aware  and comfortable with the current plan.    Eli Sue MD  [facility]    Subjective   Kalpesh is a 67 year old, presenting for the following health issues:  Hypertension         No data to display              History of Present Illness       Hypertension: He presents for follow up of hypertension.  He does check blood pressure  regularly outside of the clinic. Outside blood pressures have been over 140/90. He follows a low salt diet.     He eats 2-3 servings of fruits and vegetables " daily.He consumes 1 sweetened beverage(s) daily.He exercises with enough effort to increase his heart rate 20 to 29 minutes per day.  He exercises with enough effort to increase his heart rate 5 days per week.   He is taking medications regularly.    Today's PHQ-9         PHQ-9 Total Score: 0    PHQ-9 Q9 Thoughts of better off dead/self-harm past 2 weeks :   Not at all      Today's NICHOLAS-7 Score: 0           Review of Systems   Constitutional, HEENT, cardiovascular, pulmonary, GI, , musculoskeletal, neuro, skin, endocrine and psych systems are negative, except as otherwise noted.      Objective    Vitals - Patient Reported  Pain Score: No Pain (0)      Vitals:  No vitals were obtained today due to virtual visit.    Physical Exam   healthy, alert and no distress  PSYCH: Alert and oriented times 3; coherent speech, normal   rate and volume, able to articulate logical thoughts, able   to abstract reason, no tangential thoughts, no hallucinations   or delusions  His affect is normal  RESP: No cough, no audible wheezing, able to talk in full sentences  Remainder of exam unable to be completed due to telephone visits    No results found for this or any previous visit (from the past 24 hour(s)).            Phone call duration: 18  minutes

## 2023-08-17 DIAGNOSIS — I10 ESSENTIAL HYPERTENSION WITH GOAL BLOOD PRESSURE LESS THAN 140/90: ICD-10-CM

## 2023-08-17 RX ORDER — HYDROCHLOROTHIAZIDE 12.5 MG/1
TABLET ORAL
Qty: 30 TABLET | Refills: 0 | Status: SHIPPED | OUTPATIENT
Start: 2023-08-17 | End: 2023-09-21

## 2023-08-17 NOTE — TELEPHONE ENCOUNTER
"Requested Prescriptions   Pending Prescriptions Disp Refills    hydrochlorothiazide (HYDRODIURIL) 12.5 MG tablet 30 tablet 0     Sig: Take one pill daily       Diuretics (Including Combos) Protocol Failed - 8/17/2023  9:58 AM        Failed - Blood pressure under 140/90 in past 12 months     BP Readings from Last 3 Encounters:   06/27/23 (!) 154/90   10/19/22 (!) 160/117   09/09/22 138/80                 Passed - Recent (12 mo) or future (30 days) visit within the authorizing provider's specialty     Patient has had an office visit with the authorizing provider or a provider within the authorizing providers department within the previous 12 mos or has a future within next 30 days. See \"Patient Info\" tab in inbasket, or \"Choose Columns\" in Meds & Orders section of the refill encounter.              Passed - Medication is active on med list        Passed - Patient is age 18 or older        Passed - Normal serum creatinine on file in past 12 months     Recent Labs   Lab Test 09/09/22  1459   CR 0.88              Passed - Normal serum potassium on file in past 12 months     Recent Labs   Lab Test 09/09/22  1459   POTASSIUM 3.8                    Passed - Normal serum sodium on file in past 12 months     Recent Labs   Lab Test 09/09/22  1459                   Routing refill request to provider for review/approval because medication did not pass protocol.    Pt was last seen on 09/09/23    Yuki Bella RN  [facility]   "

## 2023-09-14 DIAGNOSIS — E78.1 HYPERTRIGLYCERIDEMIA: ICD-10-CM

## 2023-09-14 RX ORDER — FENOFIBRATE 54 MG/1
TABLET ORAL
Qty: 30 TABLET | Refills: 5 | OUTPATIENT
Start: 2023-09-14

## 2023-09-18 DIAGNOSIS — I10 ESSENTIAL HYPERTENSION WITH GOAL BLOOD PRESSURE LESS THAN 140/90: ICD-10-CM

## 2023-09-19 NOTE — TELEPHONE ENCOUNTER
One month supply sent 8/17/23  Due for BP follow up    BP Readings from Last 3 Encounters:   06/27/23 (!) 154/90   10/19/22 (!) 160/117   09/09/22 138/80       Mychart message sent to patient asking him to schedule appointment.    Sarah Randolph RN

## 2023-09-21 RX ORDER — HYDROCHLOROTHIAZIDE 12.5 MG/1
TABLET ORAL
Qty: 30 TABLET | Refills: 0 | Status: SHIPPED | OUTPATIENT
Start: 2023-09-21 | End: 2024-03-29

## 2023-11-19 ENCOUNTER — HEALTH MAINTENANCE LETTER (OUTPATIENT)
Age: 68
End: 2023-11-19

## 2023-12-08 ENCOUNTER — TELEPHONE (OUTPATIENT)
Dept: RHEUMATOLOGY | Facility: CLINIC | Age: 68
End: 2023-12-08
Payer: COMMERCIAL

## 2023-12-12 ENCOUNTER — TELEPHONE (OUTPATIENT)
Dept: RHEUMATOLOGY | Facility: CLINIC | Age: 68
End: 2023-12-12
Payer: COMMERCIAL

## 2023-12-22 DIAGNOSIS — I10 ESSENTIAL HYPERTENSION WITH GOAL BLOOD PRESSURE LESS THAN 140/90: ICD-10-CM

## 2023-12-22 RX ORDER — LISINOPRIL 40 MG/1
40 TABLET ORAL DAILY
Qty: 30 TABLET | Refills: 1 | Status: SHIPPED | OUTPATIENT
Start: 2023-12-22 | End: 2024-02-22

## 2024-01-03 DIAGNOSIS — I10 ESSENTIAL HYPERTENSION WITH GOAL BLOOD PRESSURE LESS THAN 140/90: ICD-10-CM

## 2024-01-04 RX ORDER — METOPROLOL SUCCINATE 100 MG/1
100 TABLET, EXTENDED RELEASE ORAL DAILY
Qty: 30 TABLET | Refills: 1 | Status: SHIPPED | OUTPATIENT
Start: 2024-01-04 | End: 2024-03-01

## 2024-02-22 DIAGNOSIS — I10 ESSENTIAL HYPERTENSION WITH GOAL BLOOD PRESSURE LESS THAN 140/90: ICD-10-CM

## 2024-02-22 RX ORDER — LISINOPRIL 40 MG/1
40 TABLET ORAL DAILY
Qty: 30 TABLET | Refills: 1 | Status: SHIPPED | OUTPATIENT
Start: 2024-02-22 | End: 2024-03-29

## 2024-02-26 NOTE — TELEPHONE ENCOUNTER
Left nondetailed vm informing patient Rx was sent to pharmacy.  Patient is also due for medication follow up.   Informed patient to schedule follow up.    Lis PEREZ

## 2024-03-01 DIAGNOSIS — I10 ESSENTIAL HYPERTENSION WITH GOAL BLOOD PRESSURE LESS THAN 140/90: ICD-10-CM

## 2024-03-01 RX ORDER — METOPROLOL SUCCINATE 100 MG/1
100 TABLET, EXTENDED RELEASE ORAL DAILY
Qty: 30 TABLET | Refills: 1 | Status: SHIPPED | OUTPATIENT
Start: 2024-03-01 | End: 2024-03-29

## 2024-03-11 DIAGNOSIS — E78.1 HYPERTRIGLYCERIDEMIA: ICD-10-CM

## 2024-03-11 RX ORDER — FENOFIBRATE 54 MG/1
TABLET ORAL
Qty: 30 TABLET | Refills: 0 | OUTPATIENT
Start: 2024-03-11

## 2024-03-28 SDOH — HEALTH STABILITY: PHYSICAL HEALTH: ON AVERAGE, HOW MANY DAYS PER WEEK DO YOU ENGAGE IN MODERATE TO STRENUOUS EXERCISE (LIKE A BRISK WALK)?: 6 DAYS

## 2024-03-28 SDOH — HEALTH STABILITY: PHYSICAL HEALTH: ON AVERAGE, HOW MANY MINUTES DO YOU ENGAGE IN EXERCISE AT THIS LEVEL?: 30 MIN

## 2024-03-28 ASSESSMENT — PATIENT HEALTH QUESTIONNAIRE - PHQ9
SUM OF ALL RESPONSES TO PHQ QUESTIONS 1-9: 0
SUM OF ALL RESPONSES TO PHQ QUESTIONS 1-9: 0

## 2024-03-28 ASSESSMENT — SOCIAL DETERMINANTS OF HEALTH (SDOH): HOW OFTEN DO YOU GET TOGETHER WITH FRIENDS OR RELATIVES?: TWICE A WEEK

## 2024-03-29 ENCOUNTER — OFFICE VISIT (OUTPATIENT)
Dept: FAMILY MEDICINE | Facility: CLINIC | Age: 69
End: 2024-03-29
Payer: COMMERCIAL

## 2024-03-29 VITALS
TEMPERATURE: 97.9 F | RESPIRATION RATE: 16 BRPM | BODY MASS INDEX: 31.42 KG/M2 | HEART RATE: 73 BPM | HEIGHT: 72 IN | DIASTOLIC BLOOD PRESSURE: 90 MMHG | WEIGHT: 232 LBS | SYSTOLIC BLOOD PRESSURE: 188 MMHG | OXYGEN SATURATION: 99 %

## 2024-03-29 DIAGNOSIS — F33.1 MODERATE EPISODE OF RECURRENT MAJOR DEPRESSIVE DISORDER (H): ICD-10-CM

## 2024-03-29 DIAGNOSIS — E78.5 HYPERLIPIDEMIA LDL GOAL <130: ICD-10-CM

## 2024-03-29 DIAGNOSIS — E78.1 HYPERTRIGLYCERIDEMIA: ICD-10-CM

## 2024-03-29 DIAGNOSIS — M10.9 ACUTE GOUT INVOLVING TOE OF RIGHT FOOT, UNSPECIFIED CAUSE: ICD-10-CM

## 2024-03-29 DIAGNOSIS — I10 ESSENTIAL HYPERTENSION WITH GOAL BLOOD PRESSURE LESS THAN 140/90: ICD-10-CM

## 2024-03-29 DIAGNOSIS — Z00.00 ENCOUNTER FOR ROUTINE ADULT HEALTH EXAMINATION WITHOUT ABNORMAL FINDINGS: Primary | ICD-10-CM

## 2024-03-29 DIAGNOSIS — Z11.3 SCREEN FOR STD (SEXUALLY TRANSMITTED DISEASE): ICD-10-CM

## 2024-03-29 LAB
ALBUMIN SERPL BCG-MCNC: 4.3 G/DL (ref 3.5–5.2)
ALP SERPL-CCNC: 53 U/L (ref 40–150)
ALT SERPL W P-5'-P-CCNC: 25 U/L (ref 0–70)
ANION GAP SERPL CALCULATED.3IONS-SCNC: 12 MMOL/L (ref 7–15)
AST SERPL W P-5'-P-CCNC: 26 U/L (ref 0–45)
BILIRUB SERPL-MCNC: 0.6 MG/DL
BUN SERPL-MCNC: 15.6 MG/DL (ref 8–23)
CALCIUM SERPL-MCNC: 9.3 MG/DL (ref 8.8–10.2)
CHLORIDE SERPL-SCNC: 105 MMOL/L (ref 98–107)
CHOLEST SERPL-MCNC: 215 MG/DL
CREAT SERPL-MCNC: 1.05 MG/DL (ref 0.67–1.17)
DEPRECATED HCO3 PLAS-SCNC: 23 MMOL/L (ref 22–29)
EGFRCR SERPLBLD CKD-EPI 2021: 77 ML/MIN/1.73M2
FASTING STATUS PATIENT QL REPORTED: YES
GLUCOSE SERPL-MCNC: 123 MG/DL (ref 70–99)
HBA1C MFR BLD: 5.3 % (ref 0–5.6)
HDLC SERPL-MCNC: 34 MG/DL
LDLC SERPL CALC-MCNC: 134 MG/DL
NONHDLC SERPL-MCNC: 181 MG/DL
POTASSIUM SERPL-SCNC: 4.4 MMOL/L (ref 3.4–5.3)
PROT SERPL-MCNC: 7.1 G/DL (ref 6.4–8.3)
PSA SERPL DL<=0.01 NG/ML-MCNC: 1.27 NG/ML (ref 0–4.5)
SODIUM SERPL-SCNC: 140 MMOL/L (ref 135–145)
TRIGL SERPL-MCNC: 236 MG/DL
URATE SERPL-MCNC: 7.5 MG/DL (ref 3.4–7)

## 2024-03-29 PROCEDURE — 99214 OFFICE O/P EST MOD 30 MIN: CPT | Mod: 25 | Performed by: FAMILY MEDICINE

## 2024-03-29 PROCEDURE — 80053 COMPREHEN METABOLIC PANEL: CPT | Performed by: FAMILY MEDICINE

## 2024-03-29 PROCEDURE — 36415 COLL VENOUS BLD VENIPUNCTURE: CPT | Performed by: FAMILY MEDICINE

## 2024-03-29 PROCEDURE — 80061 LIPID PANEL: CPT | Performed by: FAMILY MEDICINE

## 2024-03-29 PROCEDURE — 83036 HEMOGLOBIN GLYCOSYLATED A1C: CPT | Performed by: FAMILY MEDICINE

## 2024-03-29 PROCEDURE — G0103 PSA SCREENING: HCPCS | Performed by: FAMILY MEDICINE

## 2024-03-29 PROCEDURE — 84550 ASSAY OF BLOOD/URIC ACID: CPT | Performed by: FAMILY MEDICINE

## 2024-03-29 PROCEDURE — 99397 PER PM REEVAL EST PAT 65+ YR: CPT | Performed by: FAMILY MEDICINE

## 2024-03-29 RX ORDER — METOPROLOL SUCCINATE 100 MG/1
100 TABLET, EXTENDED RELEASE ORAL DAILY
Qty: 90 TABLET | Refills: 1 | Status: SHIPPED | OUTPATIENT
Start: 2024-03-29 | End: 2024-08-15

## 2024-03-29 RX ORDER — FENOFIBRATE 54 MG/1
TABLET ORAL
Qty: 90 TABLET | Refills: 1 | Status: SHIPPED | OUTPATIENT
Start: 2024-03-29 | End: 2024-09-24

## 2024-03-29 RX ORDER — LISINOPRIL 40 MG/1
40 TABLET ORAL DAILY
Qty: 90 TABLET | Refills: 1 | Status: SHIPPED | OUTPATIENT
Start: 2024-03-29 | End: 2024-09-24

## 2024-03-29 RX ORDER — HYDROCHLOROTHIAZIDE 12.5 MG/1
TABLET ORAL
Qty: 90 TABLET | Refills: 1 | Status: SHIPPED | OUTPATIENT
Start: 2024-03-29 | End: 2024-09-24

## 2024-03-29 ASSESSMENT — PAIN SCALES - GENERAL: PAINLEVEL: NO PAIN (0)

## 2024-03-29 NOTE — PROGRESS NOTES
Preventive Care Visit  [facility]  Eli Sue MD, Family Medicine  Mar 29, 2024      SUBJECTIVE:   Kalpesh is a 68 year old, presenting for the following:  Physical        3/29/2024     9:54 AM   Additional Questions   Roomed by elaine jimenez   Left heel gout exacerbation , better now, stopped a week ago, gets those once or twice a yrs , uses NSAIDS and tart cherry juice which help  HTN  Hypercholesterolemia   Tart cherry juice preventative   Are you in the first 12 months of your Medicare coverage?  No    HPI    Today's PHQ-9 Score:       3/28/2024     8:47 PM   PHQ-9 SCORE   PHQ-9 Total Score MyChart 0   PHQ-9 Total Score 0       click delete button to remove this line now    [employer] Copyright [employer]. Licensed by the author for use in [facility]; reprinted with permission (yousuf@.[email or web address]). All rights reserved.      Do you have sleep apnea, excessive snoring or daytime drowsiness? : no    Reviewed and updated as needed this visit by clinical staff   Tobacco  Allergies  Meds              Reviewed and updated as needed this visit by Provider                  Social History     Tobacco Use     Smoking status: Never     Smokeless tobacco: Never   Substance Use Topics     Alcohol use: Yes     Alcohol/week: 0.0 standard drinks of alcohol     Comment: 5-6 drinks per week             3/28/2024     8:51 PM   Alcohol Use   Prescreen: >3 drinks/day or >7 drinks/week? No     Do you have a current opioid prescription? No  Do you use any other controlled substances or medications that are not prescribed by a provider? None          As above     Current providers sharing in care for this patient include:   Patient Care Team:  Eli Sue MD as PCP - General (Family Practice)  Eli Sue MD as Assigned PCP    The following health maintenance items are reviewed in Epic and correct as of today:  Health Maintenance   Topic Date Due     HEPATITIS A IMMUNIZATION (1 of 2 - Risk 2-dose series)  09/06/1974     RSV VACCINE (Pregnancy & 60+) (1 - 1-dose 60+ series) Never done     MEDICARE ANNUAL WELLNESS VISIT  09/09/2023     LIPID  09/09/2023     ANNUAL REVIEW OF HM ORDERS  09/09/2023     PHQ-9  09/29/2024     FALL RISK ASSESSMENT  03/29/2025     GLUCOSE  09/09/2025     ADVANCE CARE PLANNING  09/11/2027     DTAP/TDAP/TD IMMUNIZATION (4 - Td or Tdap) 09/09/2032     COLORECTAL CANCER SCREENING  10/19/2032     HEPATITIS C SCREENING  Completed     DEPRESSION ACTION PLAN  Completed     INFLUENZA VACCINE  Completed     Pneumococcal Vaccine: 65+ Years  Completed     ZOSTER IMMUNIZATION  Completed     HEPATITIS B IMMUNIZATION  Completed     COVID-19 Vaccine  Completed     IPV IMMUNIZATION  Aged Out     HPV IMMUNIZATION  Aged Out     MENINGITIS IMMUNIZATION  Aged Out     RSV MONOCLONAL ANTIBODY  Aged Out     Lab work is in process  Labs reviewed in EPIC  BP Readings from Last 3 Encounters:   03/29/24 (!) 188/90   06/27/23 (!) 154/90   10/19/22 (!) 160/117    Wt Readings from Last 3 Encounters:   03/29/24 105.2 kg (232 lb)   06/27/23 104.3 kg (230 lb)   09/09/22 105.9 kg (233 lb 8 oz)                  Patient Active Problem List   Diagnosis     Hypertension goal BP (blood pressure) < 140/90     Hyperlipidemia LDL goal <130     Moderate major depression (H)     Adjustment disorder with anxiety     Essential hypertension with goal blood pressure less than 140/90     Moderate episode of recurrent major depressive disorder (H)     Past Surgical History:   Procedure Laterality Date     COLONOSCOPY  03/2009     COLONOSCOPY N/A 10/19/2022    Procedure: COLONOSCOPY;  Surgeon: Bruce Shearer MD;  Location:  GI     HC TOOTH EXTRACTION W/FORCEP  age 21    wisdom teeth     ZZC EXCISN COARCT AORTA DRCT ANAST      Hx aortic coaraction repair age 19       Social History     Tobacco Use     Smoking status: Never     Smokeless tobacco: Never   Substance Use Topics     Alcohol use: Yes     Alcohol/week: 0.0 standard drinks of  "alcohol     Comment: 5-6 drinks per week     Family History   Problem Relation Age of Onset     Psychotic Disorder Mother      Breast Cancer Mother      Mental Illness Mother         Bipolar disorder     Alcohol/Drug Father      Hypertension Father      Other Cancer Father      Alcohol/Drug Brother      Hypertension Brother      Cardiovascular Sister      Hypertension Sister      Hyperlipidemia Sister      Cardiovascular Brother      Hypertension Sister      Hypertension Brother      Alzheimer Disease Sister          Current Outpatient Medications   Medication Sig Dispense Refill     fenofibrate (LOFIBRA) 54 MG tablet TAKE 1 TABLET(54 MG) BY MOUTH DAILY 90 tablet 1     hydroCHLOROthiazide 12.5 MG tablet TAKE 1 TABLET BY MOUTH DAILY 90 tablet 1     lisinopril (ZESTRIL) 40 MG tablet Take 1 tablet (40 mg) by mouth daily 90 tablet 1     metoprolol succinate ER (TOPROL XL) 100 MG 24 hr tablet Take 1 tablet (100 mg) by mouth daily 90 tablet 1     VITAMIN C 500 MG PO TABS 1 TABLET DAILY       Allergies   Allergen Reactions     Atorvastatin Calcium      malaise     Recent Labs   Lab Test 03/29/24  1059 09/09/22  1459 08/10/21  0849 08/10/21  0849 08/17/18  0905 06/02/17  0948   A1C 5.3 5.4  --   --   --   --    * 121*  --  97 74 92   HDL 34* 28*  --  31* 25* 37*   TRIG 236* 254*  --  669* 304* 322*   ALT 25 46  --  43 52 41   CR 1.05 0.88   < > 0.91 0.85 0.96   GFRESTIMATED 77 >90   < > 88 >90 79   GFRESTBLACK  --   --   --   --  >90 >90  African American GFR Calc     POTASSIUM 4.4 3.8   < > 4.2 4.3 4.7    < > = values in this interval not displayed.          Review of Systems     Review of Systems  Constitutional, HEENT, cardiovascular, pulmonary, GI, , musculoskeletal, neuro, skin, endocrine and psych systems are negative, except as otherwise noted.    OBJECTIVE:   BP (!) 188/92   Pulse 73   Temp 97.9  F (36.6  C) (Temporal)   Resp 16   Ht 1.816 m (5' 11.5\")   Wt 105.2 kg (232 lb)   SpO2 99%   BMI 31.91 " "kg/m     Estimated body mass index is 31.91 kg/m  as calculated from the following:    Height as of this encounter: 1.816 m (5' 11.5\").    Weight as of this encounter: 105.2 kg (232 lb).  Physical Exam  GENERAL: alert and no distress  EYES: Eyes grossly normal to inspection, PERRL and conjunctivae and sclerae normal  HENT: ear canals and TM's normal, nose and mouth without ulcers or lesions  NECK: no adenopathy, no asymmetry, masses, or scars  RESP: lungs clear to auscultation - no rales, rhonchi or wheezes  CV: regular rate and rhythm, normal S1 S2, no S3 or S4, no murmur, click or rub, no peripheral edema  ABDOMEN: soft, nontender, no hepatosplenomegaly, no masses and bowel sounds normal  MS: no gross musculoskeletal defects noted, no edema  SKIN: no suspicious lesions or rashes  NEURO: Normal strength and tone, mentation intact and speech normal  PSYCH: mentation appears normal, affect normal/bright    Diagnostic Test Results:  Labs reviewed in Epic  Results for orders placed or performed in visit on 03/29/24   Lipid panel reflex to direct LDL Fasting     Status: Abnormal   Result Value Ref Range    Cholesterol 215 (H) <200 mg/dL    Triglycerides 236 (H) <150 mg/dL    Direct Measure HDL 34 (L) >=40 mg/dL    LDL Cholesterol Calculated 134 (H) <=100 mg/dL    Non HDL Cholesterol 181 (H) <130 mg/dL    Patient Fasting > 8hrs? Yes     Narrative    Cholesterol  Desirable:  <200 mg/dL    Triglycerides  Normal:  Less than 150 mg/dL  Borderline High:  150-199 mg/dL  High:  200-499 mg/dL  Very High:  Greater than or equal to 500 mg/dL    Direct Measure HDL  Female:  Greater than or equal to 50 mg/dL   Male:  Greater than or equal to 40 mg/dL    LDL Cholesterol  Desirable:  <100mg/dL  Above Desirable:  100-129 mg/dL   Borderline High:  130-159 mg/dL   High:  160-189 mg/dL   Very High:  >= 190 mg/dL    Non HDL Cholesterol  Desirable:  130 mg/dL  Above Desirable:  130-159 mg/dL  Borderline High:  160-189 mg/dL  High:  190-219 " mg/dL  Very High:  Greater than or equal to 220 mg/dL   Comprehensive metabolic panel (BMP + Alb, Alk Phos, ALT, AST, Total. Bili, TP)     Status: Abnormal   Result Value Ref Range    Sodium 140 135 - 145 mmol/L    Potassium 4.4 3.4 - 5.3 mmol/L    Carbon Dioxide (CO2) 23 22 - 29 mmol/L    Anion Gap 12 7 - 15 mmol/L    Urea Nitrogen 15.6 8.0 - 23.0 mg/dL    Creatinine 1.05 0.67 - 1.17 mg/dL    GFR Estimate 77 >60 mL/min/1.73m2    Calcium 9.3 8.8 - 10.2 mg/dL    Chloride 105 98 - 107 mmol/L    Glucose 123 (H) 70 - 99 mg/dL    Alkaline Phosphatase 53 40 - 150 U/L    AST 26 0 - 45 U/L    ALT 25 0 - 70 U/L    Protein Total 7.1 6.4 - 8.3 g/dL    Albumin 4.3 3.5 - 5.2 g/dL    Bilirubin Total 0.6 <=1.2 mg/dL   PSA, screen     Status: Normal   Result Value Ref Range    Prostate Specific Antigen Screen 1.27 0.00 - 4.50 ng/mL    Narrative    This result is obtained using the Roche Elecsys total PSA method on the juanis [id number] immunoassay analyzer. Results obtained with different assay methods or kits cannot be used interchangeably.   Hemoglobin A1c     Status: Normal   Result Value Ref Range    Hemoglobin A1C 5.3 0.0 - 5.6 %   Uric acid     Status: Abnormal   Result Value Ref Range    Uric Acid 7.5 (H) 3.4 - 7.0 mg/dL       ASSESSMENT / PLAN:   Hyperlipidemia LDL goal <130  Pt is fasting for lipids check , is on a lipid lowering medication   - Lipid panel reflex to direct LDL Fasting; Future  - Comprehensive metabolic panel (BMP + Alb, Alk Phos, ALT, AST, Total. Bili, TP); Future  - Lipid panel reflex to direct LDL Fasting  - Comprehensive metabolic panel (BMP + Alb, Alk Phos, ALT, AST, Total. Bili, TP)    Encounter for routine adult health examination without abnormal findings  Doing well , will do screening tests   - PSA, screen; Future  - Hemoglobin A1c; Future  - PSA, screen  - Hemoglobin A1c    Hypertriglyceridemia  Pt has been on the fenofibrate and I have refilled the Rx , denies any side effects   - Lipid panel reflex to  "direct LDL Fasting; Future  - fenofibrate (LOFIBRA) 54 MG tablet; TAKE 1 TABLET(54 MG) BY MOUTH DAILY  - Lipid panel reflex to direct LDL Fasting    Essential hypertension with goal blood pressure less than 140/90  He has been out of the diuretic otherwise his BP is well controlled on the current medications but ran out of the hydrochlorothiazide , I have refilled his Rx s , no side effects , will check labs today as above   - hydroCHLOROthiazide 12.5 MG tablet; TAKE 1 TABLET BY MOUTH DAILY  - lisinopril (ZESTRIL) 40 MG tablet; Take 1 tablet (40 mg) by mouth daily  - metoprolol succinate ER (TOPROL XL) 100 MG 24 hr tablet; Take 1 tablet (100 mg) by mouth daily    Acute gout involving toe of right foot, unspecified cause  He is over the acute gout attack , we discussed seeing a rheumatologist , he will be in the future   States that tart cherry juice helps , could drink it daily to prevent exacerbations   Will check uric acid   - Uric acid; Future  - Uric acid    Moderate episode of recurrent major depressive disorder (H)  Is under control and doing well     Screen for STD (sexually transmitted disease)  Will screen , no symptoms   - HIV Antigen Antibody Combo; Future    Patient has been advised of split billing requirements and indicates understanding: Yes      Counseling  Reviewed preventive health counseling, as reflected in patient instructions       Regular exercise       Healthy diet/nutrition       Vision screening       Hearing screening       Dental care       Bladder control       Fall risk prevention      BMI  Estimated body mass index is 31.91 kg/m  as calculated from the following:    Height as of this encounter: 1.816 m (5' 11.5\").    Weight as of this encounter: 105.2 kg (232 lb).         He reports that he has never smoked. He has never used smokeless tobacco.      Appropriate preventive services were discussed with this patient, including applicable screening as appropriate for fall prevention, " nutrition, physical activity, Tobacco-use cessation, weight loss and cognition.  Checklist reviewing preventive services available has been given to the patient.    Reviewed patients plan of care and provided an AVS. The Intermediate Care Plan ( asthma action plan, low back pain action plan, and migraine action plan) for Darron meets the Care Plan requirement. This Care Plan has been established and reviewed with the Patient.          Signed Electronically by: Eli Sue MD

## 2024-03-30 PROBLEM — M10.9 ACUTE GOUT INVOLVING TOE OF RIGHT FOOT, UNSPECIFIED CAUSE: Status: ACTIVE | Noted: 2024-03-30

## 2024-06-30 ENCOUNTER — APPOINTMENT (OUTPATIENT)
Dept: GENERAL RADIOLOGY | Facility: CLINIC | Age: 69
DRG: 440 | End: 2024-06-30
Attending: EMERGENCY MEDICINE
Payer: MEDICARE

## 2024-06-30 ENCOUNTER — HOSPITAL ENCOUNTER (INPATIENT)
Facility: CLINIC | Age: 69
LOS: 4 days | Discharge: HOME | DRG: 440 | End: 2024-07-04
Attending: EMERGENCY MEDICINE | Admitting: STUDENT IN AN ORGANIZED HEALTH CARE EDUCATION/TRAINING PROGRAM
Payer: MEDICARE

## 2024-06-30 ENCOUNTER — APPOINTMENT (OUTPATIENT)
Dept: ULTRASOUND IMAGING | Facility: CLINIC | Age: 69
DRG: 440 | End: 2024-06-30
Attending: EMERGENCY MEDICINE
Payer: MEDICARE

## 2024-06-30 ENCOUNTER — APPOINTMENT (OUTPATIENT)
Dept: CT IMAGING | Facility: CLINIC | Age: 69
DRG: 440 | End: 2024-06-30
Attending: EMERGENCY MEDICINE
Payer: MEDICARE

## 2024-06-30 DIAGNOSIS — R55 SYNCOPE, UNSPECIFIED SYNCOPE TYPE: ICD-10-CM

## 2024-06-30 DIAGNOSIS — R00.1 SINUS BRADYCARDIA: Primary | ICD-10-CM

## 2024-06-30 DIAGNOSIS — K85.80 OTHER ACUTE PANCREATITIS WITHOUT INFECTION OR NECROSIS: ICD-10-CM

## 2024-06-30 LAB
ALBUMIN SERPL BCG-MCNC: 3.9 G/DL (ref 3.5–5.2)
ALBUMIN UR-MCNC: NEGATIVE MG/DL
ALP SERPL-CCNC: 95 U/L (ref 40–150)
ALT SERPL W P-5'-P-CCNC: 125 U/L (ref 0–70)
ANION GAP SERPL CALCULATED.3IONS-SCNC: 14 MMOL/L (ref 7–15)
APPEARANCE UR: CLEAR
AST SERPL W P-5'-P-CCNC: 180 U/L (ref 0–45)
ATRIAL RATE - MUSE: 53 BPM
BASE EXCESS BLDV CALC-SCNC: 1 MMOL/L (ref -3–3)
BASE EXCESS BLDV CALC-SCNC: 2 MMOL/L (ref -3–3)
BASOPHILS # BLD AUTO: 0 10E3/UL (ref 0–0.2)
BASOPHILS NFR BLD AUTO: 0 %
BILIRUB SERPL-MCNC: 1.7 MG/DL
BILIRUB UR QL STRIP: NEGATIVE
BUN SERPL-MCNC: 14.4 MG/DL (ref 8–23)
CA-I BLD-MCNC: 4.6 MG/DL (ref 4.4–5.2)
CALCIUM SERPL-MCNC: 8.7 MG/DL (ref 8.8–10.2)
CHLORIDE SERPL-SCNC: 103 MMOL/L (ref 98–107)
COLOR UR AUTO: YELLOW
CPB POCT: NO
CREAT SERPL-MCNC: 1.15 MG/DL (ref 0.67–1.17)
D DIMER PPP FEU-MCNC: 14.9 UG/ML FEU (ref 0–0.5)
DEPRECATED HCO3 PLAS-SCNC: 23 MMOL/L (ref 22–29)
DIASTOLIC BLOOD PRESSURE - MUSE: NORMAL MMHG
EGFRCR SERPLBLD CKD-EPI 2021: 69 ML/MIN/1.73M2
EOSINOPHIL # BLD AUTO: 0.1 10E3/UL (ref 0–0.7)
EOSINOPHIL NFR BLD AUTO: 1 %
ERYTHROCYTE [DISTWIDTH] IN BLOOD BY AUTOMATED COUNT: 12.8 % (ref 10–15)
GLUCOSE BLD-MCNC: 158 MG/DL (ref 70–99)
GLUCOSE SERPL-MCNC: 154 MG/DL (ref 70–99)
GLUCOSE UR STRIP-MCNC: NEGATIVE MG/DL
HCO3 BLDV-SCNC: 27 MMOL/L (ref 21–28)
HCO3 BLDV-SCNC: 28 MMOL/L (ref 21–28)
HCT VFR BLD AUTO: 39.1 % (ref 40–53)
HCT VFR BLD CALC: 40 % (ref 40–53)
HGB BLD-MCNC: 13.5 G/DL (ref 13.3–17.7)
HGB BLD-MCNC: 13.6 G/DL (ref 13.3–17.7)
HGB UR QL STRIP: NEGATIVE
HOLD SPECIMEN: NORMAL
IMM GRANULOCYTES # BLD: 0 10E3/UL
IMM GRANULOCYTES NFR BLD: 0 %
INR PPP: 1.09 (ref 0.85–1.15)
INTERPRETATION ECG - MUSE: NORMAL
KETONES UR STRIP-MCNC: NEGATIVE MG/DL
LACTATE BLD-SCNC: 2.5 MMOL/L
LACTATE SERPL-SCNC: 1.8 MMOL/L (ref 0.7–2)
LEUKOCYTE ESTERASE UR QL STRIP: NEGATIVE
LIPASE SERPL-CCNC: 1834 U/L (ref 13–60)
LIPASE SERPL-CCNC: >3000 U/L (ref 13–60)
LYMPHOCYTES # BLD AUTO: 1.2 10E3/UL (ref 0.8–5.3)
LYMPHOCYTES NFR BLD AUTO: 12 %
MCH RBC QN AUTO: 32.4 PG (ref 26.5–33)
MCHC RBC AUTO-ENTMCNC: 34.5 G/DL (ref 31.5–36.5)
MCV RBC AUTO: 94 FL (ref 78–100)
MONOCYTES # BLD AUTO: 0.4 10E3/UL (ref 0–1.3)
MONOCYTES NFR BLD AUTO: 4 %
NEUTROPHILS # BLD AUTO: 8.2 10E3/UL (ref 1.6–8.3)
NEUTROPHILS NFR BLD AUTO: 83 %
NITRATE UR QL: NEGATIVE
NRBC # BLD AUTO: 0 10E3/UL
NRBC BLD AUTO-RTO: 0 /100
NT-PROBNP SERPL-MCNC: 169 PG/ML (ref 0–900)
P AXIS - MUSE: NORMAL DEGREES
PCO2 BLDV: 47 MM HG (ref 40–50)
PCO2 BLDV: 47 MM HG (ref 40–50)
PH BLDV: 7.37 [PH] (ref 7.32–7.43)
PH BLDV: 7.38 [PH] (ref 7.32–7.43)
PH UR STRIP: 7 [PH] (ref 5–7)
PLATELET # BLD AUTO: 288 10E3/UL (ref 150–450)
PO2 BLDV: 23 MM HG (ref 25–47)
PO2 BLDV: 24 MM HG (ref 25–47)
POTASSIUM BLD-SCNC: 4.1 MMOL/L (ref 3.4–5.3)
POTASSIUM SERPL-SCNC: 3.9 MMOL/L (ref 3.4–5.3)
PR INTERVAL - MUSE: 188 MS
PROT SERPL-MCNC: 6.4 G/DL (ref 6.4–8.3)
QRS DURATION - MUSE: 120 MS
QT - MUSE: 478 MS
QTC - MUSE: 448 MS
R AXIS - MUSE: 217 DEGREES
RADIOLOGIST FLAGS: ABNORMAL
RBC # BLD AUTO: 4.17 10E6/UL (ref 4.4–5.9)
RBC URINE: 0 /HPF
SAO2 % BLDV: 38 % (ref 70–75)
SAO2 % BLDV: 41 % (ref 70–75)
SODIUM BLD-SCNC: 140 MMOL/L (ref 135–145)
SODIUM SERPL-SCNC: 140 MMOL/L (ref 135–145)
SP GR UR STRIP: 1 (ref 1–1.03)
SYSTOLIC BLOOD PRESSURE - MUSE: NORMAL MMHG
T AXIS - MUSE: 174 DEGREES
TROPONIN T BLD-MCNC: 0.01 UG/L
TROPONIN T SERPL HS-MCNC: 10 NG/L
TROPONIN T SERPL HS-MCNC: 10 NG/L
UROBILINOGEN UR STRIP-MCNC: 2 MG/DL
VENTRICULAR RATE- MUSE: 53 BPM
WBC # BLD AUTO: 9.8 10E3/UL (ref 4–11)
WBC URINE: <1 /HPF

## 2024-06-30 PROCEDURE — 999N000175 HC STATISTIC STROKE CODE W/ ACCESS

## 2024-06-30 PROCEDURE — 85610 PROTHROMBIN TIME: CPT | Performed by: EMERGENCY MEDICINE

## 2024-06-30 PROCEDURE — 250N000011 HC RX IP 250 OP 636: Performed by: EMERGENCY MEDICINE

## 2024-06-30 PROCEDURE — 82803 BLOOD GASES ANY COMBINATION: CPT

## 2024-06-30 PROCEDURE — 36415 COLL VENOUS BLD VENIPUNCTURE: CPT

## 2024-06-30 PROCEDURE — 85025 COMPLETE CBC W/AUTO DIFF WBC: CPT | Performed by: EMERGENCY MEDICINE

## 2024-06-30 PROCEDURE — 120N000002 HC R&B MED SURG/OB UMMC

## 2024-06-30 PROCEDURE — 83880 ASSAY OF NATRIURETIC PEPTIDE: CPT | Performed by: EMERGENCY MEDICINE

## 2024-06-30 PROCEDURE — 96374 THER/PROPH/DIAG INJ IV PUSH: CPT | Performed by: EMERGENCY MEDICINE

## 2024-06-30 PROCEDURE — 250N000011 HC RX IP 250 OP 636: Performed by: NURSE PRACTITIONER

## 2024-06-30 PROCEDURE — 80053 COMPREHEN METABOLIC PANEL: CPT | Performed by: EMERGENCY MEDICINE

## 2024-06-30 PROCEDURE — 74174 CTA ABD&PLVS W/CONTRAST: CPT | Mod: 26 | Performed by: RADIOLOGY

## 2024-06-30 PROCEDURE — 84484 ASSAY OF TROPONIN QUANT: CPT

## 2024-06-30 PROCEDURE — 258N000003 HC RX IP 258 OP 636: Performed by: EMERGENCY MEDICINE

## 2024-06-30 PROCEDURE — 76705 ECHO EXAM OF ABDOMEN: CPT

## 2024-06-30 PROCEDURE — 85379 FIBRIN DEGRADATION QUANT: CPT | Performed by: EMERGENCY MEDICINE

## 2024-06-30 PROCEDURE — 74174 CTA ABD&PLVS W/CONTRAST: CPT | Mod: MG

## 2024-06-30 PROCEDURE — 81001 URINALYSIS AUTO W/SCOPE: CPT | Performed by: NURSE PRACTITIONER

## 2024-06-30 PROCEDURE — 96375 TX/PRO/DX INJ NEW DRUG ADDON: CPT | Performed by: EMERGENCY MEDICINE

## 2024-06-30 PROCEDURE — 99223 1ST HOSP IP/OBS HIGH 75: CPT | Mod: AI | Performed by: PEDIATRICS

## 2024-06-30 PROCEDURE — 93010 ELECTROCARDIOGRAM REPORT: CPT | Performed by: EMERGENCY MEDICINE

## 2024-06-30 PROCEDURE — 36415 COLL VENOUS BLD VENIPUNCTURE: CPT | Performed by: EMERGENCY MEDICINE

## 2024-06-30 PROCEDURE — 84484 ASSAY OF TROPONIN QUANT: CPT | Performed by: EMERGENCY MEDICINE

## 2024-06-30 PROCEDURE — 83605 ASSAY OF LACTIC ACID: CPT

## 2024-06-30 PROCEDURE — 83690 ASSAY OF LIPASE: CPT | Performed by: EMERGENCY MEDICINE

## 2024-06-30 PROCEDURE — 71275 CT ANGIOGRAPHY CHEST: CPT | Mod: 26 | Performed by: RADIOLOGY

## 2024-06-30 PROCEDURE — 99291 CRITICAL CARE FIRST HOUR: CPT | Mod: 25 | Performed by: EMERGENCY MEDICINE

## 2024-06-30 PROCEDURE — 99207 PR APP CREDIT; MD BILLING SHARED VISIT: CPT | Performed by: NURSE PRACTITIONER

## 2024-06-30 PROCEDURE — 99291 CRITICAL CARE FIRST HOUR: CPT | Performed by: EMERGENCY MEDICINE

## 2024-06-30 PROCEDURE — 71045 X-RAY EXAM CHEST 1 VIEW: CPT | Mod: 26 | Performed by: RADIOLOGY

## 2024-06-30 PROCEDURE — 96376 TX/PRO/DX INJ SAME DRUG ADON: CPT | Performed by: EMERGENCY MEDICINE

## 2024-06-30 PROCEDURE — 999N000127 HC STATISTIC PERIPHERAL IV START W US GUIDANCE

## 2024-06-30 PROCEDURE — 76705 ECHO EXAM OF ABDOMEN: CPT | Mod: 26 | Performed by: RADIOLOGY

## 2024-06-30 PROCEDURE — 71045 X-RAY EXAM CHEST 1 VIEW: CPT

## 2024-06-30 RX ORDER — IOPAMIDOL 755 MG/ML
100 INJECTION, SOLUTION INTRAVASCULAR ONCE
Status: COMPLETED | OUTPATIENT
Start: 2024-06-30 | End: 2024-06-30

## 2024-06-30 RX ORDER — AMOXICILLIN 250 MG
1 CAPSULE ORAL 2 TIMES DAILY PRN
Status: DISCONTINUED | OUTPATIENT
Start: 2024-06-30 | End: 2024-07-04 | Stop reason: HOSPADM

## 2024-06-30 RX ORDER — CALCIUM CARBONATE 500 MG/1
1000 TABLET, CHEWABLE ORAL 4 TIMES DAILY PRN
Status: DISCONTINUED | OUTPATIENT
Start: 2024-06-30 | End: 2024-07-04 | Stop reason: HOSPADM

## 2024-06-30 RX ORDER — LIDOCAINE 40 MG/G
CREAM TOPICAL
Status: DISCONTINUED | OUTPATIENT
Start: 2024-06-30 | End: 2024-07-04 | Stop reason: HOSPADM

## 2024-06-30 RX ORDER — SODIUM CHLORIDE, SODIUM LACTATE, POTASSIUM CHLORIDE, CALCIUM CHLORIDE 600; 310; 30; 20 MG/100ML; MG/100ML; MG/100ML; MG/100ML
INJECTION, SOLUTION INTRAVENOUS CONTINUOUS
Status: DISCONTINUED | OUTPATIENT
Start: 2024-06-30 | End: 2024-07-04 | Stop reason: HOSPADM

## 2024-06-30 RX ORDER — ONDANSETRON 4 MG/1
4 TABLET, ORALLY DISINTEGRATING ORAL EVERY 6 HOURS PRN
Status: DISCONTINUED | OUTPATIENT
Start: 2024-06-30 | End: 2024-07-04 | Stop reason: HOSPADM

## 2024-06-30 RX ORDER — ONDANSETRON 2 MG/ML
4 INJECTION INTRAMUSCULAR; INTRAVENOUS ONCE
Status: COMPLETED | OUTPATIENT
Start: 2024-06-30 | End: 2024-06-30

## 2024-06-30 RX ORDER — ACETAMINOPHEN 325 MG/1
650 TABLET ORAL EVERY 4 HOURS PRN
Status: DISCONTINUED | OUTPATIENT
Start: 2024-06-30 | End: 2024-07-04 | Stop reason: HOSPADM

## 2024-06-30 RX ORDER — METOPROLOL SUCCINATE 100 MG/1
100 TABLET, EXTENDED RELEASE ORAL DAILY
Status: DISCONTINUED | OUTPATIENT
Start: 2024-07-01 | End: 2024-07-04 | Stop reason: HOSPADM

## 2024-06-30 RX ORDER — MORPHINE SULFATE 4 MG/ML
4 INJECTION, SOLUTION INTRAMUSCULAR; INTRAVENOUS ONCE
Status: COMPLETED | OUTPATIENT
Start: 2024-06-30 | End: 2024-06-30

## 2024-06-30 RX ORDER — LISINOPRIL 40 MG/1
40 TABLET ORAL DAILY
Status: DISCONTINUED | OUTPATIENT
Start: 2024-07-01 | End: 2024-07-01

## 2024-06-30 RX ORDER — HYDROMORPHONE HYDROCHLORIDE 1 MG/ML
.3-.5 INJECTION, SOLUTION INTRAMUSCULAR; INTRAVENOUS; SUBCUTANEOUS
Status: DISCONTINUED | OUTPATIENT
Start: 2024-06-30 | End: 2024-07-04 | Stop reason: HOSPADM

## 2024-06-30 RX ORDER — ACETAMINOPHEN 650 MG/1
650 SUPPOSITORY RECTAL EVERY 4 HOURS PRN
Status: DISCONTINUED | OUTPATIENT
Start: 2024-06-30 | End: 2024-07-04 | Stop reason: HOSPADM

## 2024-06-30 RX ORDER — ONDANSETRON 2 MG/ML
4 INJECTION INTRAMUSCULAR; INTRAVENOUS EVERY 6 HOURS PRN
Status: DISCONTINUED | OUTPATIENT
Start: 2024-06-30 | End: 2024-07-04 | Stop reason: HOSPADM

## 2024-06-30 RX ORDER — AMOXICILLIN 250 MG
2 CAPSULE ORAL 2 TIMES DAILY PRN
Status: DISCONTINUED | OUTPATIENT
Start: 2024-06-30 | End: 2024-07-04 | Stop reason: HOSPADM

## 2024-06-30 RX ADMIN — MORPHINE SULFATE 2 MG: 4 INJECTION INTRAVENOUS at 17:45

## 2024-06-30 RX ADMIN — HYDROMORPHONE HYDROCHLORIDE 0.3 MG: 1 INJECTION, SOLUTION INTRAMUSCULAR; INTRAVENOUS; SUBCUTANEOUS at 23:14

## 2024-06-30 RX ADMIN — ONDANSETRON 4 MG: 2 INJECTION INTRAMUSCULAR; INTRAVENOUS at 18:12

## 2024-06-30 RX ADMIN — IOPAMIDOL 100 ML: 755 INJECTION, SOLUTION INTRAVENOUS at 18:58

## 2024-06-30 RX ADMIN — MORPHINE SULFATE 4 MG: 4 INJECTION INTRAVENOUS at 20:47

## 2024-06-30 RX ADMIN — SODIUM CHLORIDE 1000 ML: 9 INJECTION, SOLUTION INTRAVENOUS at 17:54

## 2024-06-30 RX ADMIN — FAMOTIDINE 20 MG: 10 INJECTION, SOLUTION INTRAVENOUS at 17:45

## 2024-06-30 ASSESSMENT — ACTIVITIES OF DAILY LIVING (ADL)
ADLS_ACUITY_SCORE: 35

## 2024-06-30 NOTE — ED PROVIDER NOTES
[place] EMERGENCY DEPARTMENT ([facility])    6/30/24       History     Chief Complaint   Patient presents with    Syncope     HPI  Darron Schwarz is a 68 year old male who with PMH of HTN, hyperlipidemia, MDD and anxiety presents to the ED due to syncope.    Patient is a 68-year-old male who was brought in by EMS for concern for syncope.  Patient says that he walked around [employer] at [place] [employer] today.  He afterwards ended up going to [employer].  At [employer] he had one alcoholic drink.  He said that he was sitting and all of a sudden he got some epigastric pain that was going around his back.  Few seconds after the pain started patient ended up losing consciousness.  He says he slumped over in his chair.  When he awoke he was feeling nauseous and ended up vomiting one time. Also, he was feeling sweaty and unwell.  He denies ever having any shortness of breath or chest pain.  The pain was more in the top of his belly.  He says he did not drink as much water today.  Notes a history of high blood pressure and has been on lisinopril, metoprolol and hydrochlorothiazide. He says that normally his heart rates in the 60s-70s.  Currently, he notes a little bit of epigastric pain but no back pain.  No arm pain.  No weakness or dizziness.  He does state that he has had a history of syncope in the past usually related to pain.  He says he was feeling fine earlier today.  He says he had similar epigastric pain to this in the past but it self resolved.  Patient was transferred by EMS as a code red due to concern for possible cardiac etiology.    This part of the medical record was transcribed by TAMIKA MALLORY, Medical Scribe, from a dictation done by Johana Tineo MD.     Past Medical History  Past Medical History:   Diagnosis Date    Anxiety state, unspecified     Depressive disorder 1/2012    Related to ending of relationship.  No longer taking meds    Other and unspecified hyperlipidemia      Unspecified essential hypertension     Varicella without mention of complication      Past Surgical History:   Procedure Laterality Date    COLONOSCOPY  03/2009    COLONOSCOPY N/A 10/19/2022    Procedure: COLONOSCOPY;  Surgeon: Bruce Shearer MD;  Location: [facility]    HC TOOTH EXTRACTION W/FORCEP  age 21    wisdom teeth    ZZC EXCISN COARCT AORTA DRCT ANAST      Hx aortic coaraction repair age 19     fenofibrate (LOFIBRA) 54 MG tablet  hydroCHLOROthiazide 12.5 MG tablet  lisinopril (ZESTRIL) 40 MG tablet  metoprolol succinate ER (TOPROL XL) 100 MG 24 hr tablet  VITAMIN C 500 MG PO TABS      Allergies   Allergen Reactions    Atorvastatin Calcium      malaise     Family History  Family History   Problem Relation Age of Onset    Psychotic Disorder Mother     Breast Cancer Mother     Mental Illness Mother         Bipolar disorder    Alcohol/Drug Father     Hypertension Father     Other Cancer Father     Alcohol/Drug Brother     Hypertension Brother     Cardiovascular Sister     Hypertension Sister     Hyperlipidemia Sister     Cardiovascular Brother     Hypertension Sister     Hypertension Brother     Alzheimer Disease Sister      Social History   Social History     Tobacco Use    Smoking status: Never    Smokeless tobacco: Never   Vaping Use    Vaping status: Never Used   Substance Use Topics    Alcohol use: Yes     Alcohol/week: 0.0 standard drinks of alcohol     Comment: 5-6 drinks per week    Drug use: No      Past medical history, past surgical history, medications, allergies, family history, and social history were reviewed with the patient. No additional pertinent items.   A complete review of systems was performed with pertinent positives and negatives noted in the HPI, and all other systems negative.    Physical Exam      Physical Exam  Constitutional:       General: He is not in acute distress.     Appearance: He is ill-appearing. He is not toxic-appearing or diaphoretic.      Comments: Pale, diaphoretic;     HENT:      Head: Normocephalic and atraumatic.      Mouth/Throat:      Mouth: Mucous membranes are moist.   Cardiovascular:      Rate and Rhythm: Bradycardia present.      Heart sounds: No murmur heard.     No friction rub. No gallop.      Comments: + femoral pulse equal, bilateral  Pulmonary:      Effort: Pulmonary effort is normal. No respiratory distress.      Breath sounds: Normal breath sounds. No wheezing.   Abdominal:      General: There is no distension.      Tenderness: There is abdominal tenderness (mild epigastric pain).   Musculoskeletal:         General: No swelling or tenderness.   Skin:     Coloration: Skin is not jaundiced or pale.      Findings: No bruising or erythema.   Neurological:      General: No focal deficit present.      Mental Status: He is oriented to person, place, and time. Mental status is at baseline.   Psychiatric:         Mood and Affect: Mood normal.         Behavior: Behavior normal.         Thought Content: Thought content normal.           ED Course, Procedures, & Data      Procedures            EKG Interpretation:      Interpreted by Johana Tineo MD  Time reviewed: 1734  Symptoms at time of EKG: None   Rhythm: sinus bradycardia  Rate: 50-60  Axis: Normal  Ectopy: none  Conduction: normal  ST Segments/ T Waves: No ST-T wave changes and T wave inversion II and III  Q Waves: aVf  Comparison to prior: No old EKG available    Clinical Impression: sinus bradycardia      Results for orders placed or performed during the hospital encounter of 06/30/24   XR Chest Port 1 View     Status: None    Narrative    Exam: XR CHEST PORT 1 VIEW, 6/30/2024 5:37 PM    Indication: STEMI.    Comparison: None.    Findings:   Portable supine AP view of the chest. Mediastinal surgical clips.  Trachea is midline. Cardiomediastinal silhouette is within normal  limits. No focal airspace opacity. Streaky perihilar and bibasilar  opacities. No appreciable pneumothorax or pleural effusion.  Visualized  portions of the upper abdomen are unremarkable. No acute osseous  abnormality.      Impression    Impression: Streaky perihilar and bibasilar opacities, likely  atelectasis and/or pulmonary edema.    I have personally reviewed the examination and initial interpretation  and I agree with the findings.    ANGELIKA DUFF MD         SYSTEM ID:  [id number]   CTA Chest Abdomen Pelvis w Contrast     Status: Abnormal   Result Value Ref Range    Radiologist flags Pancreatitis (Urgent)     Narrative    EXAM: CT chest, abdomen, and pelvis without and with intravenous  contrast. 6/30/2024 7:36 PM    HISTORY: epigastric pain; concern for dissection    TECHNIQUE: Helical acquisition of image data was performed for the  chest, abdomen, and pelvis without and with intravenous contrast.    COMPARISON: None.    FINDINGS:    VASCULATURE:  Post surgical changes of aortic coarctation repair. Normal caliber  great vessels. Major branches are patent. No aneurysms. No focal  stenoses.    CHEST:  Lungs: The central airways are clear. No suspicious nodules. The right  dependent atelectasis with solitary pulmonary cyst. No focal airspace  consolidation. No pleural effusions. No pneumothorax.     Mediastinum: Normal thyroid. No lymphadenopathy. Normal heart. Normal  esophagus.    ABDOMEN/PELVIS:    Hepatobiliary: No masses. Hepatic steatosis. Cholelithiasis. Common  bile duct is normal in caliber.    Pancreas: Fatty atrophy of the pancreas. Peripancreatic fat stranding  and fluid.    Spleen: Normal.     Adrenal glands: Normal.    Genitourinary: Multiple nonobstructive punctate renal calculi  bilaterally, largest measuring up to 2 mm. No hydronephrosis. Prostate  calcifications.    Gastrointestinal: No dilated bowel.    Other: No free air. No free fluid.    BONES/SOFT TISSUE: Degenerative changes of the spine. No acute or  suspicious osseous lesions. Fat-containing left inguinal hernia.        Impression    IMPRESSION:  1. No  evidence of dissection.  2. Acute interstitial pancreatitis.      [Urgent Result: Pancreatitis]    Finding was identified on 6/30/2024 7:40 PM.     Dr. Tineo was contacted by Dr. Stephenson at 6/30/2024 8:02 PM and  verbalized understanding of the urgent finding.     I have personally reviewed the examination and initial interpretation  and I agree with the findings.    ANGELIKA DUFF MD         SYSTEM ID:  [id number]   [place] Draw     Status: None (In process)    Narrative    The following orders were created for panel order [place] Draw.  Procedure                               Abnormality         Status                     ---------                               -----------         ------                     Extra Blue Top Tube[[id number]]                              Final result               Extra Red Top Tube[[id number]]                                                          Extra Green Top (Lithium...[[id number]]                      Final result               Extra Purple Top Tube[[id number]]                            Final result                 Please view results for these tests on the individual orders.   [place] Draw *Canceled*     Status: None ()    Narrative    The following orders were created for panel order [place] Draw.  Procedure                               Abnormality         Status                     ---------                               -----------         ------                       Please view results for these tests on the individual orders.   [place] Draw     Status: None (In process)    Narrative    The following orders were created for panel order [place] Draw.  Procedure                               Abnormality         Status                     ---------                               -----------         ------                     Extra Blue Top Tube[[id number]]                                                         Extra Red Top Tube[[id number]]                               Final  result               Extra Green Top (Lithium...[[id number]]                                                 Extra Purple Top Tube[[id number]]                                                         Please view results for these tests on the individual orders.   Extra Blue Top Tube     Status: None   Result Value Ref Range    Hold Specimen JIC    Extra Green Top (Lithium Heparin) Tube     Status: None   Result Value Ref Range    Hold Specimen JIC    Extra Purple Top Tube     Status: None   Result Value Ref Range    Hold Specimen JIC    Extra Red Top Tube     Status: None   Result Value Ref Range    Hold Specimen JIC    Troponin T, High Sensitivity     Status: Normal   Result Value Ref Range    Troponin T, High Sensitivity 10 <=22 ng/L   iStat Gases Electrolytes ICA Glucose Venous, POCT     Status: Abnormal   Result Value Ref Range    CPB Applied No     Hematocrit POCT 40 40 - 53 %    Calcium, Ionized Whole Blood POCT 4.6 4.4 - 5.2 mg/dL    Glucose Whole Blood POCT 158 (H) 70 - 99 mg/dL    Bicarbonate Venous POCT 28 21 - 28 mmol/L    Hemoglobin POCT 13.6 13.3 - 17.7 g/dL    Potassium POCT 4.1 3.4 - 5.3 mmol/L    Sodium POCT 140 135 - 145 mmol/L    pCO2 Venous POCT 47 40 - 50 mm Hg    pO2 Venous POCT 24 (L) 25 - 47 mm Hg    pH Venous POCT 7.38 7.32 - 7.43    O2 Sat, Venous POCT 41 (L) 70 - 75 %    Base Excess/Deficit (+/-) POCT 2.0 -3.0 - 3.0 mmol/L   iStat Gases (lactate) venous, POCT     Status: Abnormal   Result Value Ref Range    Lactic Acid POCT 2.5 (H) <=2.0 mmol/L    Bicarbonate Venous POCT 27 21 - 28 mmol/L    O2 Sat, Venous POCT 38 (L) 70 - 75 %    pCO2 Venous POCT 47 40 - 50 mm Hg    pH Venous POCT 7.37 7.32 - 7.43    pO2 Venous POCT 23 (L) 25 - 47 mm Hg    Base Excess/Deficit (+/-) POCT 1.0 -3.0 - 3.0 mmol/L   Comprehensive metabolic panel     Status: Abnormal   Result Value Ref Range    Sodium 140 135 - 145 mmol/L    Potassium 3.9 3.4 - 5.3 mmol/L    Carbon Dioxide (CO2) 23 22 - 29 mmol/L    Anion Gap 14 7 -  15 mmol/L    Urea Nitrogen 14.4 8.0 - 23.0 mg/dL    Creatinine 1.15 0.67 - 1.17 mg/dL    GFR Estimate 69 >60 mL/min/1.73m2    Calcium 8.7 (L) 8.8 - 10.2 mg/dL    Chloride 103 98 - 107 mmol/L    Glucose 154 (H) 70 - 99 mg/dL    Alkaline Phosphatase 95 40 - 150 U/L     (H) 0 - 45 U/L     (H) 0 - 70 U/L    Protein Total 6.4 6.4 - 8.3 g/dL    Albumin 3.9 3.5 - 5.2 g/dL    Bilirubin Total 1.7 (H) <=1.2 mg/dL   INR     Status: Normal   Result Value Ref Range    INR 1.09 0.85 - 1.15   D dimer quantitative     Status: Abnormal   Result Value Ref Range    D-Dimer Quantitative 14.90 (H) 0.00 - 0.50 ug/mL FEU    Narrative    This D-dimer assay is intended for use in conjunction with a clinical pretest probability assessment model to exclude pulmonary embolism (PE) and deep venous thrombosis (DVT) in outpatients suspected of PE or DVT. The cut-off value is 0.50 ug/mL FEU.    For patients 50 years of age or older, the application of age-adjusted cut-off values for D-Dimer may increase the specificity without significant effect on sensitivity. The literature suggested calculation age adjusted cut-off in ug/L = age in years x 10 ug/L. The results in this laboratory are reported as ug/mL rather than ug/L. The calculation for age adjusted cut off in ug/mL= age in years x 0.01 ug/mL. For example, the cut off for a 76 year old male is 76 x 0.01 ug/mL = 0.76 ug/mL (760 ug/L).    M Amena et al. Age adjusted D-dimer cut-off levels to rule out pulmonary embolism: The ADJUST-PE Study. CANDACE 2014;311:8973-6709.; ALANA Quiros et al. Diagnostic accuracy of conventional or age adjusted D-dimer cutoff values in older patients with suspected venous thromboembolism. Systemic review and meta-analysis. BMJ 2013:346:f2492.   Nt probnp inpatient (BNP)     Status: Normal   Result Value Ref Range    N terminal Pro BNP Inpatient 169 0 - 900 pg/mL   Lipase     Status: Abnormal   Result Value Ref Range    Lipase >3,000 (H) 13 - 60 U/L    iStat Troponin, POCT     Status: Normal   Result Value Ref Range    TROPPC POCT 0.01 <=0.12 ug/L   CBC with platelets and differential     Status: Abnormal   Result Value Ref Range    WBC Count 9.8 4.0 - 11.0 10e3/uL    RBC Count 4.17 (L) 4.40 - 5.90 10e6/uL    Hemoglobin 13.5 13.3 - 17.7 g/dL    Hematocrit 39.1 (L) 40.0 - 53.0 %    MCV 94 78 - 100 fL    MCH 32.4 26.5 - 33.0 pg    MCHC 34.5 31.5 - 36.5 g/dL    RDW 12.8 10.0 - 15.0 %    Platelet Count 288 150 - 450 10e3/uL    % Neutrophils 83 %    % Lymphocytes 12 %    % Monocytes 4 %    % Eosinophils 1 %    % Basophils 0 %    % Immature Granulocytes 0 %    NRBCs per 100 WBC 0 <1 /100    Absolute Neutrophils 8.2 1.6 - 8.3 10e3/uL    Absolute Lymphocytes 1.2 0.8 - 5.3 10e3/uL    Absolute Monocytes 0.4 0.0 - 1.3 10e3/uL    Absolute Eosinophils 0.1 0.0 - 0.7 10e3/uL    Absolute Basophils 0.0 0.0 - 0.2 10e3/uL    Absolute Immature Granulocytes 0.0 <=0.4 10e3/uL    Absolute NRBCs 0.0 10e3/uL   [place] Draw     Status: None (In process)    Narrative    The following orders were created for panel order [place] Draw.  Procedure                               Abnormality         Status                     ---------                               -----------         ------                     Extra Purple Top Tube[[id number]]                            In process                   Please view results for these tests on the individual orders.   Lactic acid whole blood     Status: Normal   Result Value Ref Range    Lactic Acid 1.8 0.7 - 2.0 mmol/L   Troponin T, High Sensitivity     Status: Normal   Result Value Ref Range    Troponin T, High Sensitivity 10 <=22 ng/L   Lipase     Status: Abnormal   Result Value Ref Range    Lipase 1,834 (H) 13 - 60 U/L   EKG 12-lead, tracing only     Status: None   Result Value Ref Range    Systolic Blood Pressure  mmHg    Diastolic Blood Pressure  mmHg    Ventricular Rate 53 BPM    Atrial Rate 53 BPM    MO Interval 188 ms    QRS Duration 120 ms      ms    QTc 448 ms    P Axis  degrees    R AXIS 217 degrees    T Axis 174 degrees    Interpretation ECG       ** Suspect arm lead reversal, interpretation assumes no reversal  Sinus bradycardia  Right superior axis deviation  Non-specific intra-ventricular conduction delay  ST & T wave abnormality, consider inferior ischemia  Abnormal ECG  Unconfirmed report - interpretation of this ECG is computer generated - see medical record for final interpretation  Confirmed by - EMERGENCY ROOM, PHYSICIAN ([id number]),  MIKE SAHNI ([id number]) on 6/30/2024 7:48:47 PM     CBC with platelets differential     Status: Abnormal    Narrative    The following orders were created for panel order CBC with platelets differential.  Procedure                               Abnormality         Status                     ---------                               -----------         ------                     CBC with platelets and d...[[id number]]  Abnormal            Final result                 Please view results for these tests on the individual orders.     Medications   famotidine (PEPCID) injection 20 mg (20 mg Intravenous $Given 6/30/24 1745)   morphine (PF) injection 4 mg (2 mg Intravenous $Given 6/30/24 1745)   sodium chloride 0.9% BOLUS 1,000 mL (0 mLs Intravenous Stopped 6/30/24 1824)   ondansetron (ZOFRAN) injection 4 mg (4 mg Intravenous $Given 6/30/24 1812)   iopamidol (ISOVUE-370) solution 100 mL (100 mLs Intravenous $Given 6/30/24 1858)   sodium chloride (PF) 0.9% PF flush 90 mL (90 mLs Intravenous $Given 6/30/24 1858)   morphine (PF) injection 4 mg (4 mg Intravenous $Given 6/30/24 2047)         The Lactic acid level is elevated due to pancreatitis; dehydration, at this time there is no sign of severe sepsis or septic shock.       No results found for any visits on 06/30/24.  Medications - No data to display  Labs Ordered and Resulted from Time of ED Arrival to Time of ED Departure - No data to display  XR Chest Port 1 View     (Results Pending)          Critical Care Addendum  My initial assessment, based on my review of prehospital provider report and review of nursing observations, established a high suspicion that Darron Schwarz has severe bradycardia and syncope , which requires immediate intervention, and therefore he is critically ill.     After the initial assessment, the care team initiated multiple lab tests, initiated IV fluid administration, and consulted with cardiolgoy  to provide stabilization care. Due to the critical nature of this patient, I reassessed nursing observations, vital signs, physical exam, and review of cardiac rhythm monitor multiple times prior to his disposition.     Time also spent performing documentation and reviewing test results.     Critical care time (excluding teaching time and procedures): 30 minutes.       Assessment & Plan    Patient is a very nice 68-year-old male who presented to the ER due to syncope and epigastric pain.  Initially patient's blood pressures in both arms are stable at 114.  Patient was bradycardic to 49-53.  Patient was alert and oriented.  Patient was satting well on room air.  Initial differential for this patient was possible STEMI versus cardiac dissection versus dehydration and vasovagal, pancreatitis.  Initial chest x-ray showed no air below the diaphragms.  Normal heart.  Patient had bilateral strong femoral pulses.    6:53pm: D-dimer is acutely elevated.   Concern for dissection.  We sent the patient to CT but CT refused to do the study.  I called radiology and radiology said that it was protocol to the CT with able to be done.  Again radiology tech refused.  Told the patient that we are override the fact that we do not have a creatinine.  Patient's creatinine in the past has been stable at 0.81.  Patient still having some mild epigastric pain.  1 to make sure evaluating for possible aortic injury.  Patient symptomatically is feeling better.  His heart rate has  improved from the low 50s to the mid to high 60s.  Patient's blood pressure is also improved.  Case was also reviewed with the cardiology staff Dr. Ciro Reddy.  He reviewed the EKG and agrees with the ED workup.    CT showed no signs of any cardiopulmonary process.  Patient was found to have necrotizing pancreatitis on the CT.  Patient's lab work took approximately 3 hours to come back.  The lipase came back elevated to above 3000.  He had mild TIFFANY.  His acute pancreatitis is likely was causing his pain.  Patient be admitted to the hospital for treatment for this.  Patient's high-sensitivity troponin came back normal.  I spoke to the patient's regarding the results.  He did tell me that he had 1 alcoholic drink today.  He said he had 2 last night.  That most recently in the past month he got back from a cruise where he drank more than typical.  Says he typically drinks 1-2 drinks a night.  No prior history of pancreatitis.  Radiology said that they did not see any stones on the CT.  Will still obtain a right upper quadrant ultrasound to make sure there is no concern for ductal stone.  Patient be admitted to internal medicine for further care.      I have reviewed the nursing notes. I have reviewed the findings, diagnosis, plan and need for follow up with the patient.    New Prescriptions    No medications on file       Final diagnoses:   Syncope, unspecified syncope type   Other acute pancreatitis without infection or necrosis   I, TAMIKA MALLORY, am serving as a trained medical scribe to document services personally performed by Johana Tineo MD, based on the provider's statements to me.     I, Johana Tineo MD, was physically present and have reviewed and verified the accuracy of this note documented by TAMIKA MALLORY.     Johana Tineo MD.   [facility] EMERGENCY DEPARTMENT  6/30/2024     Johana Tineo MD  06/30/24 2118

## 2024-06-30 NOTE — ED TRIAGE NOTES
BIBA from bar when pt started having epigastric pain 5/10 that radiated to back around 1645.  1 drink and coffee, denies substance use. 1 episode of syncope denies strike of head and 1 round of emesis. PT aOx4   And diaphoretic

## 2024-07-01 LAB
ANION GAP SERPL CALCULATED.3IONS-SCNC: 12 MMOL/L (ref 7–15)
BUN SERPL-MCNC: 15.7 MG/DL (ref 8–23)
CALCIUM SERPL-MCNC: 8.9 MG/DL (ref 8.8–10.2)
CHLORIDE SERPL-SCNC: 103 MMOL/L (ref 98–107)
CREAT SERPL-MCNC: 0.92 MG/DL (ref 0.67–1.17)
DEPRECATED HCO3 PLAS-SCNC: 23 MMOL/L (ref 22–29)
EGFRCR SERPLBLD CKD-EPI 2021: >90 ML/MIN/1.73M2
ERYTHROCYTE [DISTWIDTH] IN BLOOD BY AUTOMATED COUNT: 12.8 % (ref 10–15)
GLUCOSE SERPL-MCNC: 142 MG/DL (ref 70–99)
HCT VFR BLD AUTO: 41.9 % (ref 40–53)
HGB BLD-MCNC: 14.9 G/DL (ref 13.3–17.7)
MCH RBC QN AUTO: 32.3 PG (ref 26.5–33)
MCHC RBC AUTO-ENTMCNC: 35.6 G/DL (ref 31.5–36.5)
MCV RBC AUTO: 91 FL (ref 78–100)
PLATELET # BLD AUTO: 354 10E3/UL (ref 150–450)
POTASSIUM SERPL-SCNC: 4 MMOL/L (ref 3.4–5.3)
RBC # BLD AUTO: 4.61 10E6/UL (ref 4.4–5.9)
SODIUM SERPL-SCNC: 138 MMOL/L (ref 135–145)
TRIGL SERPL-MCNC: 172 MG/DL
WBC # BLD AUTO: 10.3 10E3/UL (ref 4–11)

## 2024-07-01 PROCEDURE — 80048 BASIC METABOLIC PNL TOTAL CA: CPT | Performed by: NURSE PRACTITIONER

## 2024-07-01 PROCEDURE — 36415 COLL VENOUS BLD VENIPUNCTURE: CPT | Performed by: NURSE PRACTITIONER

## 2024-07-01 PROCEDURE — 99233 SBSQ HOSP IP/OBS HIGH 50: CPT | Performed by: INTERNAL MEDICINE

## 2024-07-01 PROCEDURE — 84478 ASSAY OF TRIGLYCERIDES: CPT | Performed by: INTERNAL MEDICINE

## 2024-07-01 PROCEDURE — 250N000011 HC RX IP 250 OP 636: Performed by: NURSE PRACTITIONER

## 2024-07-01 PROCEDURE — 250N000011 HC RX IP 250 OP 636: Performed by: INTERNAL MEDICINE

## 2024-07-01 PROCEDURE — 258N000003 HC RX IP 258 OP 636: Performed by: NURSE PRACTITIONER

## 2024-07-01 PROCEDURE — 250N000013 HC RX MED GY IP 250 OP 250 PS 637: Performed by: STUDENT IN AN ORGANIZED HEALTH CARE EDUCATION/TRAINING PROGRAM

## 2024-07-01 PROCEDURE — 120N000002 HC R&B MED SURG/OB UMMC

## 2024-07-01 PROCEDURE — 36415 COLL VENOUS BLD VENIPUNCTURE: CPT | Performed by: INTERNAL MEDICINE

## 2024-07-01 PROCEDURE — 85014 HEMATOCRIT: CPT | Performed by: NURSE PRACTITIONER

## 2024-07-01 PROCEDURE — 250N000013 HC RX MED GY IP 250 OP 250 PS 637: Performed by: NURSE PRACTITIONER

## 2024-07-01 RX ORDER — HYDROCHLOROTHIAZIDE 12.5 MG/1
12.5 TABLET ORAL DAILY
Status: DISCONTINUED | OUTPATIENT
Start: 2024-07-02 | End: 2024-07-04 | Stop reason: HOSPADM

## 2024-07-01 RX ORDER — HYDROCHLOROTHIAZIDE 12.5 MG/1
12.5 TABLET ORAL ONCE
Status: COMPLETED | OUTPATIENT
Start: 2024-07-01 | End: 2024-07-01

## 2024-07-01 RX ORDER — LISINOPRIL 40 MG/1
40 TABLET ORAL ONCE
Status: COMPLETED | OUTPATIENT
Start: 2024-07-01 | End: 2024-07-01

## 2024-07-01 RX ORDER — HYDRALAZINE HYDROCHLORIDE 20 MG/ML
10 INJECTION INTRAMUSCULAR; INTRAVENOUS EVERY 4 HOURS PRN
Status: DISCONTINUED | OUTPATIENT
Start: 2024-07-01 | End: 2024-07-04 | Stop reason: HOSPADM

## 2024-07-01 RX ORDER — LISINOPRIL 40 MG/1
40 TABLET ORAL DAILY
Status: DISCONTINUED | OUTPATIENT
Start: 2024-07-02 | End: 2024-07-04 | Stop reason: HOSPADM

## 2024-07-01 RX ADMIN — HYDROMORPHONE HYDROCHLORIDE 0.5 MG: 1 INJECTION, SOLUTION INTRAMUSCULAR; INTRAVENOUS; SUBCUTANEOUS at 16:41

## 2024-07-01 RX ADMIN — HYDROMORPHONE HYDROCHLORIDE 0.5 MG: 1 INJECTION, SOLUTION INTRAMUSCULAR; INTRAVENOUS; SUBCUTANEOUS at 12:19

## 2024-07-01 RX ADMIN — SODIUM CHLORIDE, POTASSIUM CHLORIDE, SODIUM LACTATE AND CALCIUM CHLORIDE: 600; 310; 30; 20 INJECTION, SOLUTION INTRAVENOUS at 12:25

## 2024-07-01 RX ADMIN — LISINOPRIL 40 MG: 40 TABLET ORAL at 04:58

## 2024-07-01 RX ADMIN — ACETAMINOPHEN 650 MG: 325 TABLET, FILM COATED ORAL at 04:35

## 2024-07-01 RX ADMIN — HYDROMORPHONE HYDROCHLORIDE 0.5 MG: 1 INJECTION, SOLUTION INTRAMUSCULAR; INTRAVENOUS; SUBCUTANEOUS at 01:30

## 2024-07-01 RX ADMIN — HYDROMORPHONE HYDROCHLORIDE 0.5 MG: 1 INJECTION, SOLUTION INTRAMUSCULAR; INTRAVENOUS; SUBCUTANEOUS at 19:36

## 2024-07-01 RX ADMIN — HYDROCHLOROTHIAZIDE 12.5 MG: 12.5 TABLET ORAL at 02:59

## 2024-07-01 RX ADMIN — HYDRALAZINE HYDROCHLORIDE 10 MG: 20 INJECTION INTRAMUSCULAR; INTRAVENOUS at 12:20

## 2024-07-01 RX ADMIN — HYDROMORPHONE HYDROCHLORIDE 0.5 MG: 1 INJECTION, SOLUTION INTRAMUSCULAR; INTRAVENOUS; SUBCUTANEOUS at 08:33

## 2024-07-01 RX ADMIN — HYDRALAZINE HYDROCHLORIDE 10 MG: 20 INJECTION INTRAMUSCULAR; INTRAVENOUS at 19:36

## 2024-07-01 RX ADMIN — SODIUM CHLORIDE, POTASSIUM CHLORIDE, SODIUM LACTATE AND CALCIUM CHLORIDE: 600; 310; 30; 20 INJECTION, SOLUTION INTRAVENOUS at 19:27

## 2024-07-01 RX ADMIN — HYDROMORPHONE HYDROCHLORIDE 0.5 MG: 1 INJECTION, SOLUTION INTRAMUSCULAR; INTRAVENOUS; SUBCUTANEOUS at 04:36

## 2024-07-01 RX ADMIN — HYDROMORPHONE HYDROCHLORIDE 0.5 MG: 1 INJECTION, SOLUTION INTRAMUSCULAR; INTRAVENOUS; SUBCUTANEOUS at 21:54

## 2024-07-01 RX ADMIN — METOPROLOL SUCCINATE 100 MG: 100 TABLET, EXTENDED RELEASE ORAL at 08:24

## 2024-07-01 RX ADMIN — SODIUM CHLORIDE, POTASSIUM CHLORIDE, SODIUM LACTATE AND CALCIUM CHLORIDE: 600; 310; 30; 20 INJECTION, SOLUTION INTRAVENOUS at 02:59

## 2024-07-01 ASSESSMENT — ACTIVITIES OF DAILY LIVING (ADL)
ADLS_ACUITY_SCORE: 20
ADLS_ACUITY_SCORE: 35
ADLS_ACUITY_SCORE: 20
ADLS_ACUITY_SCORE: 35
ADLS_ACUITY_SCORE: 20

## 2024-07-01 NOTE — MEDICATION SCRIBE - ADMISSION MEDICATION HISTORY
Medication Scribe Admission Medication History    Admission medication history is complete. The information provided in this note is only as accurate as the sources available at the time of the update.    Information Source(s): Patient via in-person    Pertinent Information:   None  Changes made to PTA medication list:  Added: None  Deleted: None  Changed: None    Allergies reviewed with patient and updates made in EHR: yes    Medication History Completed By: Sue Ortega 6/30/2024 10:23 PM    PTA Med List   Medication Sig Last Dose    fenofibrate (LOFIBRA) 54 MG tablet TAKE 1 TABLET(54 MG) BY MOUTH DAILY 6/30/2024 at am    hydroCHLOROthiazide 12.5 MG tablet TAKE 1 TABLET BY MOUTH DAILY 6/30/2024 at am    metoprolol succinate ER (TOPROL XL) 100 MG 24 hr tablet Take 1 tablet (100 mg) by mouth daily 6/30/2024 at am    VITAMIN C 500 MG PO TABS 1 TABLET DAILY 6/30/2024 at am

## 2024-07-01 NOTE — CARE PLAN
ADMISSION to [address] [place] [address] UNIT:    Darron Schwarz was admitted from ED for Syncope.  2 RN skin assessment: completed by Kalpana Wright.  Result of skin assessment and interventions/actions: No significant skin findings   Height, weight: completed? Yes  Patient belongings & admission documents: see Flowsheets, completed? Yes        Patient came in @0200 from ED, blood pressure 186/82 but didn't get any treatment in their unit. Transferred to bed safely. Vital signs taken and BP was still elevated, informed provider, Hydrochlorothiazide was ordered and given. Oriented to the room. On room air with O2 saturation >92%. Hooked to telemetry as ordered-SR. @0400 rechecked BP still elevated, informed , Lisinopril given as ordered. @0652 rechecked BP, 159/94. Handoff report given to next NOD for continued POC.

## 2024-07-01 NOTE — H&P
[facility]    History and Physical - Hospitalist Service       Date of Admission:  6/30/2024    Assessment & Plan      Darron Schwarz is a 68 year old man admitted on 6/30/2024. He has a history of hypertension, hyperlipidemia, anxiety and depression and is admitted with acute pancreatitis.    1) Acute pancreatitis - Presents with new epigastric and back pain, elevated lipase and CT findings of acute pancreatitis.  He regularly drinks 1-2 drinks per night.  US pending to evaluate for stones.  - LR @ 150 ccs/hr  - Hydromorphone PRN pain  - Regular diet, would advance slowly  - Low threshold to start alcohol withdrawal protocol if any concerns    2) Syncope -  The patient developed syncope with abdominal pain earlier today, and has a long history of vasovagal syncope.  Pain appears to be a fairly reliable trigger for him  - Fall precautions    3) History of hypertension  - Continue home lisinopril, metoprolol, hold hydrochlorothiazide given plans for volume repletion          Diet:  Regular diet  DVT Prophylaxis: Pneumatic Compression Devices  Chu Catheter: Not present  Lines: None     Cardiac Monitoring: None  Code Status:  Full    Clinically Significant Risk Factors Present on Admission                  # Hypertension: Noted on problem list                        Disposition Plan     Medically Ready for Discharge: Anticipated in 2-4 Days         The patient's care was discussed with the Attending Physician, Dr. Van .    BHARTI Lovell CNP  Hospitalist Service  [facility]  Securely message with [employer] (more info)  Text page via [facility] Paging/Directory     ______________________________________________________________________    Chief Complaint   Abdominal pain, syncope    History is obtained from the patient    History of Present Illness   Darron Schwarz is a 68 year old man admitted on 6/30/2024. He  has a history of hypertension, hyperlipidemia, anxiety and depression and is admitted with acute pancreatitis.    The patient tells me he was having a drink with friends today when he developed back pain that transitioned to epigastric abdominal pain.  Shortly after he became diaphoretic, passed out briefly and then woke up and vomited.  He does confirm he has a history of vasovagal episodes, often with illness, exercise and pain as the triggers.    He reports he drinks 1-2 drinks most nights.  He was on a cruise a month ago where he was drinking more frequently.      Past Medical History    Past Medical History:   Diagnosis Date    Anxiety state, unspecified     Depressive disorder 2012    Related to ending of relationship.  No longer taking meds    Other and unspecified hyperlipidemia     Unspecified essential hypertension     Varicella without mention of complication        Past Surgical History   Past Surgical History:   Procedure Laterality Date    COLONOSCOPY  2009    COLONOSCOPY N/A 10/19/2022    Procedure: COLONOSCOPY;  Surgeon: Bruce Shearer MD;  Location: [facility]    HC TOOTH EXTRACTION W/FORCEP  age 21    wisdom teeth    ZZC EXCISN COARCT AORTA DRCT ANAST      Hx aortic coaraction repair age 19       Prior to Admission Medications   Prior to Admission Medications   Prescriptions Last Dose Informant Patient Reported? Taking?   VITAMIN C 500 MG PO TABS   Yes No   Si TABLET DAILY   fenofibrate (LOFIBRA) 54 MG tablet   No No   Sig: TAKE 1 TABLET(54 MG) BY MOUTH DAILY   hydroCHLOROthiazide 12.5 MG tablet   No No   Sig: TAKE 1 TABLET BY MOUTH DAILY   lisinopril (ZESTRIL) 40 MG tablet   No No   Sig: Take 1 tablet (40 mg) by mouth daily   metoprolol succinate ER (TOPROL XL) 100 MG 24 hr tablet   No No   Sig: Take 1 tablet (100 mg) by mouth daily      Facility-Administered Medications: None           Physical Exam   Vital Signs: Temp: 97.5  F (36.4  C)   BP: (!) 111/90 Pulse: 67   Resp: 20 SpO2: 98 % O2  Device: None (Room air)      Physical Exam   Constitutional:   Well nourished, well developed, resting comfortably   Cardiovascular: Regular rate and rhythm without murmurs or gallops  Pulmonary/Chest: Clear to auscultation bilaterally, with no wheezes or retractions. No respiratory distress.  GI: Soft with good bowel sounds.  Non-tender, non-distended, with no guarding, no rebound, no peritoneal signs.   Musculoskeletal:  No edema or clubbing   Skin: Skin is warm and dry. No rash noted.   Neurological: Alert and oriented to person, place, and time. Nonfocal exam  Psychiatric:  Normal mood and affect.      Medical Decision Making       30 MINUTES SPENT BY ME on the date of service doing chart review, history, exam, documentation & further activities per the note.      Data   CBC, BMP, CT scan reviewed

## 2024-07-01 NOTE — PROGRESS NOTES
Called for elevated BP. Gave PO PTA antihypertensives early.     MARIN Yates MD  Internal Medicine-Pediatrics

## 2024-07-01 NOTE — PROGRESS NOTES
[facility]    Medicine Progress Note - Hospitalist Service, GOLD TEAM 18    Date of Admission:  6/30/2024    Assessment & Plan   Darron Schwarz is a 68 year old man admitted on 6/30/2024. He has a history of hypertension, hyperlipidemia, anxiety and depression.  Admitted with acute pancreatitis.     Possible alcohol induced acute pancreatitis  ---   He regularly drinks 1-2 drinks per night.    ---   He was consuming etoh with a friend on 6/30 when he acutely developed back pain that transitioned to epigastric abdominal pain.  Shortly after that he became diaphoretic, passed out briefly and then woke up and vomited.  He does confirm h/o  vasovagal episodes, often with illness, exercise and pain as the triggers.   ---   Lipase > 3000 ---> 1834  ---   , , TB 1.7 at admit  ---   Ca level wnl  ---   Triglyceride level pending  ---   CT CAP w/ iv contrast 6/30 negative for dissection but revealed acute interstitial pancreatitis.  ---   RUQ abd US 6/30 revealed cholelithiasis without acute cholecystitis, normal diameter of the CBD and no evidence of choledocholithiasis. Diffuse hepatic steatosis present.  ---   Pain is better  ---   Lipase trending down  ---   Pt tolerated CLD  ---   Advance diet to low fat  ---   LR @ 150 ccs/hr  ---   Hydromorphone PRN pain  ---   Regular diet, would advance slowly  ---   Low threshold to start alcohol withdrawal protocol if any concerns     Syncope   ---   The patient developed syncope with abdominal pain.    ---   He has a long history of vasovagal syncope.    ---   Pain appears to be a fairly reliable trigger for him  ---   Fall precautions  ---   Monitor pt on tele     HTN  ---   Continue home  hydrochlorothiazide, lisinopril and metoprolol  ---   Start prn IV hydralazine             Diet: Low Saturated Fat Diet    DVT Prophylaxis: Pneumatic Compression Devices  Chu Catheter: Not present  Lines: None     Cardiac  Monitoring: ACTIVE order. Indication: Syncope- low cardiac risk (24 hours)  Code Status: Full Code    Disposition Plan   Anticipate 1-2 more days of hospitalization for treatment of acute pancreatitis          Kristine Montes MD  Hospitalist Service, GOLD TEAM 18  [facility]  Securely message with [employer] (more info)  Text page via [facility] Paging/Directory   See signed in provider for up to date coverage information  ______________________________________________________________________    Interval History   Abd and back pain  He tolerated CLD well  No syncopal episode since admit.        Physical Exam   Vital Signs: Temp: 97.4  F (36.3  C) Temp src: Oral BP: (!) 172/80 Pulse: 69   Resp: 16 SpO2: 93 % O2 Device: None (Room air)    Weight: 225 lbs 4.96 oz  General: aao x 3, NAD.  HEENT:  NC/AT, PERRL, EOMI, neck supple, no thyromegaly, op clear, mmm.  CVS:  NL s 1 and s2, no m/r/g.  Lungs:  CTA B/L.   Abd:  Soft, + bs, NT, no rebound or gaurding, no fluid shift.  Ext:  No c/c.  Lymph:  No edema.  Neuro:  Nonfocal.  Musculoskeletal: No calf tenderness to palpation.    Skin:  No rash.  Psychiatry:  Mood and affect appropriate.        Data     I have personally reviewed the following data over the past 24 hrs:    10.3  \   14.9   / 354     138 103 15.7 /  142 (H)   4.0 23 0.92 \     ALT: 125 (H) AST: 180 (H) AP: 95 TBILI: 1.7 (H)   ALB: 3.9 TOT PROTEIN: 6.4 LIPASE: 1,834 (H)     Trop: 10 BNP: 169     Procal: N/A CRP: N/A Lactic Acid: 1.8       INR:  1.09 PTT:  N/A   D-dimer:  14.90 (H) Fibrinogen:  N/A       Imaging results reviewed over the past 24 hrs:   Recent Results (from the past 24 hour(s))   XR Chest Port 1 View    Narrative    Exam: XR CHEST PORT 1 VIEW, 6/30/2024 5:37 PM    Indication: STEMI.    Comparison: None.    Findings:   Portable supine AP view of the chest. Mediastinal surgical clips.  Trachea is midline. Cardiomediastinal silhouette is within normal  limits.  No focal airspace opacity. Streaky perihilar and bibasilar  opacities. No appreciable pneumothorax or pleural effusion. Visualized  portions of the upper abdomen are unremarkable. No acute osseous  abnormality.      Impression    Impression: Streaky perihilar and bibasilar opacities, likely  atelectasis and/or pulmonary edema.    I have personally reviewed the examination and initial interpretation  and I agree with the findings.    ANGELIKA DUFF MD         SYSTEM ID:  [id number]   CTA Chest Abdomen Pelvis w Contrast   Result Value    Radiologist flags Pancreatitis (Urgent)    Narrative    EXAM: CT chest, abdomen, and pelvis without and with intravenous  contrast. 6/30/2024 7:36 PM    HISTORY: epigastric pain; concern for dissection    TECHNIQUE: Helical acquisition of image data was performed for the  chest, abdomen, and pelvis without and with intravenous contrast.    COMPARISON: None.    FINDINGS:    VASCULATURE:  Post surgical changes of aortic coarctation repair. Normal caliber  great vessels. Major branches are patent. No aneurysms. No focal  stenoses.    CHEST:  Lungs: The central airways are clear. No suspicious nodules. The right  dependent atelectasis with solitary pulmonary cyst. No focal airspace  consolidation. No pleural effusions. No pneumothorax.     Mediastinum: Normal thyroid. No lymphadenopathy. Normal heart. Normal  esophagus.    ABDOMEN/PELVIS:    Hepatobiliary: No masses. Hepatic steatosis. Cholelithiasis. Common  bile duct is normal in caliber.    Pancreas: Fatty atrophy of the pancreas. Peripancreatic fat stranding  and fluid.    Spleen: Normal.     Adrenal glands: Normal.    Genitourinary: Multiple nonobstructive punctate renal calculi  bilaterally, largest measuring up to 2 mm. No hydronephrosis. Prostate  calcifications.    Gastrointestinal: No dilated bowel.    Other: No free air. No free fluid.    BONES/SOFT TISSUE: Degenerative changes of the spine. No acute or  suspicious osseous  lesions. Fat-containing left inguinal hernia.        Impression    IMPRESSION:  1. No evidence of dissection.  2. Acute interstitial pancreatitis.      [Urgent Result: Pancreatitis]    Finding was identified on 6/30/2024 7:40 PM.     Dr. Tineo was contacted by Dr. Stephenson at 6/30/2024 8:02 PM and  verbalized understanding of the urgent finding.     I have personally reviewed the examination and initial interpretation  and I agree with the findings.    ANGELIKA DUFF MD         SYSTEM ID:  [id number]   US Abdomen Limited (RUQ)    Narrative    EXAM: Right upper quadrant abdominal ultrasound 6/30/2024 10:11 PM     HISTORY: acute pancreatitis; evaluate for gallbladder    COMPARISON: Same date CTA    TECHNIQUE: The abdomen was scanned in standard fashion with  specialized ultrasound transducer(s) using both grey scale and limited  color Doppler techniques.    FINDINGS:   Limited acoustic penetration secondary to habitus.    LIVER: The liver demonstrates diffusely increased echogenicity and  measures 11.2 cm in craniocaudal dimension. There is no evidence of a  focal hepatic mass. The main portal vein is patent with antegrade  flow.    GALLBLADDER: Multiple echogenic shadowing gallstones are present.  There is no wall thickening, pericholecystic fluid, sonographic  Alex's sign, or cholelithiasis.     BILE DUCTS: Both the intra- and extrahepatic biliary system are of  normal caliber. The common bile duct measures 3 mm in diameter.    PANCREAS: Not visualized.    RIGHT KIDNEY: The right kidney measures 11.2 cm in length. Normal  appearing renal parenchymal echogenicity and corticomedullary  differentiation. No hydronephrosis, mass, or calculus.     AORTA/IVC: The visualized portions of the aorta and IVC appear  unremarkable.     FLUID: No ascites or pleural effusions.       Impression    IMPRESSION:   1. Cholelithiasis without acute cholecystitis. Normal diameter of the  common bile duct. No evidence of  choledocholithiasis.  2. Diffuse hepatic steatosis.    I have personally reviewed the examination and initial interpretation  and I agree with the findings.    JUAN SINGH MD         SYSTEM ID:  [id number]

## 2024-07-01 NOTE — PLAN OF CARE
BP (!) 172/80   Pulse 69   Temp 97.4  F (36.3  C) (Oral)   Resp 16   Ht 1.829 m (6')   Wt 102.2 kg (225 lb 5 oz)   SpO2 93%   BMI 30.56 kg/m    Medications given to help increased BP   IVF running 150ml/hr  UP IND   IV Dilaudid given intermittent for Pancreatitis abdominal pain   Voiding in bathroom, LBM 6/30/24  Poor appetite- ate 25% per meal   Pt able to make needs known, call light within reach.   Continue POC     Problem: Adult Inpatient Plan of Care  Goal: Plan of Care Review  Description: The Plan of Care Review/Shift note should be completed every shift.  The Outcome Evaluation is a brief statement about your assessment that the patient is improving, declining, or no change.  This information will be displayed automatically on your shift  note.  Outcome: Progressing  Flowsheets (Taken 7/1/2024 1122)  Outcome Evaluation: Pt A&Ox4, pain improving, no N/T, able to eat fair appetite. IVF running  Overall Patient Progress: improving   Goal Outcome Evaluation:           Overall Patient Progress: improvingOverall Patient Progress: improving    Outcome Evaluation: Pt A&Ox4, pain improving, no N/T, able to eat fair appetite. IVF running

## 2024-07-02 LAB
ALBUMIN SERPL BCG-MCNC: 3.5 G/DL (ref 3.5–5.2)
ALP SERPL-CCNC: 76 U/L (ref 40–150)
ALT SERPL W P-5'-P-CCNC: 104 U/L (ref 0–70)
ANION GAP SERPL CALCULATED.3IONS-SCNC: 11 MMOL/L (ref 7–15)
AST SERPL W P-5'-P-CCNC: 44 U/L (ref 0–45)
BILIRUB SERPL-MCNC: 1.2 MG/DL
BUN SERPL-MCNC: 9.8 MG/DL (ref 8–23)
CALCIUM SERPL-MCNC: 8.5 MG/DL (ref 8.8–10.2)
CHLORIDE SERPL-SCNC: 100 MMOL/L (ref 98–107)
CREAT SERPL-MCNC: 0.87 MG/DL (ref 0.67–1.17)
DEPRECATED HCO3 PLAS-SCNC: 26 MMOL/L (ref 22–29)
EGFRCR SERPLBLD CKD-EPI 2021: >90 ML/MIN/1.73M2
ERYTHROCYTE [DISTWIDTH] IN BLOOD BY AUTOMATED COUNT: 13.1 % (ref 10–15)
GLUCOSE SERPL-MCNC: 102 MG/DL (ref 70–99)
HCT VFR BLD AUTO: 40.4 % (ref 40–53)
HGB BLD-MCNC: 14.3 G/DL (ref 13.3–17.7)
LIPASE SERPL-CCNC: 182 U/L (ref 13–60)
MAGNESIUM SERPL-MCNC: 1.9 MG/DL (ref 1.7–2.3)
MCH RBC QN AUTO: 33 PG (ref 26.5–33)
MCHC RBC AUTO-ENTMCNC: 35.4 G/DL (ref 31.5–36.5)
MCV RBC AUTO: 93 FL (ref 78–100)
PLATELET # BLD AUTO: 339 10E3/UL (ref 150–450)
POTASSIUM SERPL-SCNC: 3.5 MMOL/L (ref 3.4–5.3)
PROT SERPL-MCNC: 6.2 G/DL (ref 6.4–8.3)
RBC # BLD AUTO: 4.33 10E6/UL (ref 4.4–5.9)
SODIUM SERPL-SCNC: 137 MMOL/L (ref 135–145)
WBC # BLD AUTO: 16.3 10E3/UL (ref 4–11)

## 2024-07-02 PROCEDURE — 83735 ASSAY OF MAGNESIUM: CPT | Performed by: INTERNAL MEDICINE

## 2024-07-02 PROCEDURE — 80053 COMPREHEN METABOLIC PANEL: CPT | Performed by: INTERNAL MEDICINE

## 2024-07-02 PROCEDURE — 258N000003 HC RX IP 258 OP 636: Performed by: NURSE PRACTITIONER

## 2024-07-02 PROCEDURE — 250N000011 HC RX IP 250 OP 636: Performed by: INTERNAL MEDICINE

## 2024-07-02 PROCEDURE — 250N000013 HC RX MED GY IP 250 OP 250 PS 637: Performed by: INTERNAL MEDICINE

## 2024-07-02 PROCEDURE — 85027 COMPLETE CBC AUTOMATED: CPT | Performed by: INTERNAL MEDICINE

## 2024-07-02 PROCEDURE — 258N000003 HC RX IP 258 OP 636: Performed by: INTERNAL MEDICINE

## 2024-07-02 PROCEDURE — 99232 SBSQ HOSP IP/OBS MODERATE 35: CPT | Performed by: INTERNAL MEDICINE

## 2024-07-02 PROCEDURE — 120N000002 HC R&B MED SURG/OB UMMC

## 2024-07-02 PROCEDURE — 250N000011 HC RX IP 250 OP 636: Performed by: NURSE PRACTITIONER

## 2024-07-02 PROCEDURE — 83690 ASSAY OF LIPASE: CPT | Performed by: INTERNAL MEDICINE

## 2024-07-02 PROCEDURE — 36415 COLL VENOUS BLD VENIPUNCTURE: CPT | Performed by: INTERNAL MEDICINE

## 2024-07-02 PROCEDURE — 250N000013 HC RX MED GY IP 250 OP 250 PS 637: Performed by: STUDENT IN AN ORGANIZED HEALTH CARE EDUCATION/TRAINING PROGRAM

## 2024-07-02 PROCEDURE — 250N000013 HC RX MED GY IP 250 OP 250 PS 637: Performed by: NURSE PRACTITIONER

## 2024-07-02 RX ORDER — NALOXONE HYDROCHLORIDE 0.4 MG/ML
0.4 INJECTION, SOLUTION INTRAMUSCULAR; INTRAVENOUS; SUBCUTANEOUS
Status: DISCONTINUED | OUTPATIENT
Start: 2024-07-02 | End: 2024-07-04 | Stop reason: HOSPADM

## 2024-07-02 RX ORDER — NALOXONE HYDROCHLORIDE 0.4 MG/ML
0.2 INJECTION, SOLUTION INTRAMUSCULAR; INTRAVENOUS; SUBCUTANEOUS
Status: DISCONTINUED | OUTPATIENT
Start: 2024-07-02 | End: 2024-07-04 | Stop reason: HOSPADM

## 2024-07-02 RX ORDER — OXYCODONE HYDROCHLORIDE 5 MG/1
5 TABLET ORAL EVERY 4 HOURS PRN
Status: DISCONTINUED | OUTPATIENT
Start: 2024-07-02 | End: 2024-07-04 | Stop reason: HOSPADM

## 2024-07-02 RX ADMIN — METOPROLOL SUCCINATE 100 MG: 100 TABLET, EXTENDED RELEASE ORAL at 07:37

## 2024-07-02 RX ADMIN — SODIUM CHLORIDE, POTASSIUM CHLORIDE, SODIUM LACTATE AND CALCIUM CHLORIDE: 600; 310; 30; 20 INJECTION, SOLUTION INTRAVENOUS at 01:55

## 2024-07-02 RX ADMIN — HYDRALAZINE HYDROCHLORIDE 10 MG: 20 INJECTION INTRAMUSCULAR; INTRAVENOUS at 06:19

## 2024-07-02 RX ADMIN — OXYCODONE HYDROCHLORIDE 5 MG: 5 TABLET ORAL at 13:34

## 2024-07-02 RX ADMIN — HYDROCHLOROTHIAZIDE 12.5 MG: 12.5 TABLET ORAL at 07:37

## 2024-07-02 RX ADMIN — SODIUM CHLORIDE, POTASSIUM CHLORIDE, SODIUM LACTATE AND CALCIUM CHLORIDE: 600; 310; 30; 20 INJECTION, SOLUTION INTRAVENOUS at 15:25

## 2024-07-02 RX ADMIN — HYDROMORPHONE HYDROCHLORIDE 0.5 MG: 1 INJECTION, SOLUTION INTRAMUSCULAR; INTRAVENOUS; SUBCUTANEOUS at 01:55

## 2024-07-02 RX ADMIN — OXYCODONE HYDROCHLORIDE 5 MG: 5 TABLET ORAL at 23:10

## 2024-07-02 RX ADMIN — LISINOPRIL 40 MG: 40 TABLET ORAL at 07:37

## 2024-07-02 RX ADMIN — SODIUM CHLORIDE, POTASSIUM CHLORIDE, SODIUM LACTATE AND CALCIUM CHLORIDE: 600; 310; 30; 20 INJECTION, SOLUTION INTRAVENOUS at 10:22

## 2024-07-02 RX ADMIN — HYDROMORPHONE HYDROCHLORIDE 0.3 MG: 1 INJECTION, SOLUTION INTRAMUSCULAR; INTRAVENOUS; SUBCUTANEOUS at 10:22

## 2024-07-02 RX ADMIN — OXYCODONE HYDROCHLORIDE 5 MG: 5 TABLET ORAL at 19:29

## 2024-07-02 RX ADMIN — SODIUM CHLORIDE, POTASSIUM CHLORIDE, SODIUM LACTATE AND CALCIUM CHLORIDE: 600; 310; 30; 20 INJECTION, SOLUTION INTRAVENOUS at 23:11

## 2024-07-02 RX ADMIN — HYDROMORPHONE HYDROCHLORIDE 0.5 MG: 1 INJECTION, SOLUTION INTRAMUSCULAR; INTRAVENOUS; SUBCUTANEOUS at 06:19

## 2024-07-02 RX ADMIN — HYDRALAZINE HYDROCHLORIDE 10 MG: 20 INJECTION INTRAMUSCULAR; INTRAVENOUS at 02:01

## 2024-07-02 RX ADMIN — HYDRALAZINE HYDROCHLORIDE 10 MG: 20 INJECTION INTRAMUSCULAR; INTRAVENOUS at 23:11

## 2024-07-02 ASSESSMENT — ACTIVITIES OF DAILY LIVING (ADL)
ADLS_ACUITY_SCORE: 20

## 2024-07-02 NOTE — PLAN OF CARE
BP (!) 158/82 (BP Location: Left arm)   Pulse 83   Temp 100.2  F (37.9  C) (Oral)   Resp 17   Ht 1.829 m (6')   Wt 102.2 kg (225 lb 5 oz)   SpO2 99%   BMI 30.56 kg/m    VSS on RA. Denies CP or SOB      Goal Outcome Evaluation:      Plan of Care Reviewed With: patient    Overall Patient Progress: improvingOverall Patient Progress: improving    Outcome Evaluation: Pain improving, low apetite, continued IVF  mL/hr , R PIV X2 patent, Up IND to bathroom- adequate output. Pt able to make needs known, call light within reach. Added Oral Pain medication- Oxycodone given X1. Continue POC

## 2024-07-02 NOTE — PROGRESS NOTES
[facility]    Medicine Progress Note - Hospitalist Service, GOLD TEAM 17    Date of Admission:  6/30/2024    Assessment & Plan   Darron Schwarz is a 68 year old man admitted on 6/30/2024. He has a history of hypertension, hyperlipidemia, anxiety and depression.  Admitted with acute pancreatitis.     Possible alcohol induced acute pancreatitis  ---   He regularly drinks 1-2 drinks per night.    ---   He was consuming etoh with a friend on 6/30 when he acutely developed back pain that transitioned to epigastric abdominal pain.  Shortly after that he became diaphoretic, passed out briefly and then woke up and vomited.  He does confirm h/o  vasovagal episodes, often with illness, exercise and pain as the triggers.   ---   Lipase > 3000 ---> 1834  ---   , , TB 1.7 at admit  ---   Ca level wnl  ---   Triglyceride level pending  ---   CT CAP w/ iv contrast 6/30 negative for dissection but revealed acute interstitial pancreatitis.  ---   RUQ abd US 6/30 revealed cholelithiasis without acute cholecystitis, normal diameter of the CBD and no evidence of choledocholithiasis. Diffuse hepatic steatosis present.  ---   Pain is better  ---   Lipase trending down  ---   Pt tolerated CLD  ---   Advance diet to low fat  ---   LR @ 125 ccs/hr  ---   Hydromorphone PRN pain. Add Oxycodone PRN   ---   Regular diet, would advance slowly  ---   Low threshold to start alcohol withdrawal protocol if any concerns     Syncope   ---   The patient developed syncope with abdominal pain.    ---   He has a long history of vasovagal syncope.    ---   Pain appears to be a fairly reliable trigger for him  ---   Fall precautions  ---   Monitor pt on tele     HTN  ---   Continue home  hydrochlorothiazide, lisinopril and metoprolol  ---   Start prn IV hydralazine      Diet: Low Saturated Fat Diet    DVT Prophylaxis: Pneumatic Compression Devices  Chu Catheter: Not present  Lines: None      Cardiac Monitoring: None  Code Status: Full Code    Disposition Plan   Possible discharge home tomorrow.       Simon Nunez MD  Hospitalist Service, GOLD TEAM 17  [facility]  Securely message with [employer] (more info)  Text page via [employer] Paging/Directory   See signed in provider for up to date coverage information  ______________________________________________________________________    Interval History     No acute events overnight.  Good tolerance to clears but he is not feeling hungry.  Patient denies worsening pain, nausea or vomit.  All other review of systems negative.  Patient was pleasant.      Physical Exam   Vital Signs: Temp: 100  F (37.8  C) Temp src: Oral BP: (!) 178/84 Pulse: 90   Resp: 21 SpO2: 99 % O2 Device: Nasal cannula    Weight: 225 lbs 4.96 oz  General: aao x 3, NAD. Room air.   CVS:  NL s 1 and s2, no m/r/g.  Lungs:  CTA B/L.   Abd:  Soft, + bs, NT, no rebound or gaurding, no fluid shift.  Musculoskeletal: No calf tenderness to palpation.    Psychiatry:  Mood and affect appropriate.        Data     I have personally reviewed the following data over the past 24 hrs:    16.3 (H)  \   14.3   / 339     137 100 9.8 /  102 (H)   3.5 26 0.87 \     ALT: 104 (H) AST: 44 AP: 76 TBILI: 1.2   ALB: 3.5 TOT PROTEIN: 6.2 (L) LIPASE: 182 (H)       Imaging results reviewed over the past 24 hrs:   No results found for this or any previous visit (from the past 24 hour(s)).

## 2024-07-02 NOTE — PLAN OF CARE
Goal Outcome Evaluation:      Plan of Care Reviewed With: patient    Overall Patient Progress: improvingOverall Patient Progress: improving    Outcome Evaluation: Pt reports pain level improving. Continues to have low appetite. No further episodes of syncope.

## 2024-07-03 PROCEDURE — 99232 SBSQ HOSP IP/OBS MODERATE 35: CPT | Performed by: INTERNAL MEDICINE

## 2024-07-03 PROCEDURE — 250N000013 HC RX MED GY IP 250 OP 250 PS 637: Performed by: INTERNAL MEDICINE

## 2024-07-03 PROCEDURE — 258N000003 HC RX IP 258 OP 636: Performed by: INTERNAL MEDICINE

## 2024-07-03 PROCEDURE — 250N000011 HC RX IP 250 OP 636: Performed by: INTERNAL MEDICINE

## 2024-07-03 PROCEDURE — 250N000013 HC RX MED GY IP 250 OP 250 PS 637: Performed by: STUDENT IN AN ORGANIZED HEALTH CARE EDUCATION/TRAINING PROGRAM

## 2024-07-03 PROCEDURE — 120N000002 HC R&B MED SURG/OB UMMC

## 2024-07-03 PROCEDURE — 250N000013 HC RX MED GY IP 250 OP 250 PS 637: Performed by: NURSE PRACTITIONER

## 2024-07-03 RX ADMIN — LISINOPRIL 40 MG: 40 TABLET ORAL at 09:05

## 2024-07-03 RX ADMIN — OXYCODONE HYDROCHLORIDE 5 MG: 5 TABLET ORAL at 23:15

## 2024-07-03 RX ADMIN — HYDRALAZINE HYDROCHLORIDE 10 MG: 20 INJECTION INTRAMUSCULAR; INTRAVENOUS at 23:22

## 2024-07-03 RX ADMIN — OXYCODONE HYDROCHLORIDE 5 MG: 5 TABLET ORAL at 06:58

## 2024-07-03 RX ADMIN — OXYCODONE HYDROCHLORIDE 5 MG: 5 TABLET ORAL at 15:20

## 2024-07-03 RX ADMIN — ACETAMINOPHEN 650 MG: 325 TABLET, FILM COATED ORAL at 20:01

## 2024-07-03 RX ADMIN — OXYCODONE HYDROCHLORIDE 5 MG: 5 TABLET ORAL at 10:57

## 2024-07-03 RX ADMIN — HYDROCHLOROTHIAZIDE 12.5 MG: 12.5 TABLET ORAL at 09:05

## 2024-07-03 RX ADMIN — METOPROLOL SUCCINATE 100 MG: 100 TABLET, EXTENDED RELEASE ORAL at 09:05

## 2024-07-03 RX ADMIN — SODIUM CHLORIDE, POTASSIUM CHLORIDE, SODIUM LACTATE AND CALCIUM CHLORIDE: 600; 310; 30; 20 INJECTION, SOLUTION INTRAVENOUS at 06:54

## 2024-07-03 RX ADMIN — HYDRALAZINE HYDROCHLORIDE 10 MG: 20 INJECTION INTRAMUSCULAR; INTRAVENOUS at 17:57

## 2024-07-03 ASSESSMENT — ACTIVITIES OF DAILY LIVING (ADL)
ADLS_ACUITY_SCORE: 20

## 2024-07-03 NOTE — PROGRESS NOTES
[facility]    Medicine Progress Note - Hospitalist Service, GOLD TEAM 17    Date of Admission:  6/30/2024    Assessment & Plan   Darron Schwarz is a 68 year old man admitted on 6/30/2024. He has a history of hypertension, hyperlipidemia, anxiety and depression.  Admitted with acute pancreatitis.    Today's changes  -No acute events overnight.  Good tolerance to clear liquid diet.  Patient will advance his diet to regular diet during the day.  Continue monitoring for tolerance. Abdominal pain well controlled.      Possible alcohol induced acute pancreatitis  ---   He regularly drinks 1-2 drinks per night.    ---   He was consuming etoh with a friend on 6/30 when he acutely developed back pain that transitioned to epigastric abdominal pain.  Shortly after that he became diaphoretic, passed out briefly and then woke up and vomited.  He does confirm h/o  vasovagal episodes, often with illness, exercise and pain as the triggers.   ---   Lipase > 3000 ---> 1834  ---   , , TB 1.7 at admit  ---   Ca level wnl  ---   Triglyceride level pending  ---   CT CAP w/ iv contrast 6/30 negative for dissection but revealed acute interstitial pancreatitis.  ---   RUQ abd US 6/30 revealed cholelithiasis without acute cholecystitis, normal diameter of the CBD and no evidence of choledocholithiasis. Diffuse hepatic steatosis present.  ---   Pain is better  ---   Lipase trending down  ---   Pt tolerated CLD  ---   Advance diet to low fat  ---   Stop IVF's today.   ---   Hydromorphone PRN pain. Continue on Oxycodone PRN   ---   Regular diet  ---   Low threshold to start alcohol withdrawal protocol if any concerns     Syncope   ---   The patient developed syncope with abdominal pain.    ---   He has a long history of vasovagal syncope.    ---   Pain appears to be a fairly reliable trigger for him  ---   Fall precautions  ---   Monitor pt on tele     HTN  ---   Continue home   hydrochlorothiazide, lisinopril and metoprolol  ---   Start prn IV hydralazine      Diet: Low Saturated Fat Diet    DVT Prophylaxis: Pneumatic Compression Devices  Chu Catheter: Not present  Lines: None     Cardiac Monitoring: None  Code Status: Full Code    Disposition Plan   Possible discharge home tomorrow.       Simon Nunez MD  Hospitalist Service, GOLD TEAM 17  [facility]  Securely message with [employer] (more info)  Text page via [employer] Paging/Directory   See signed in provider for up to date coverage information  ______________________________________________________________________    Interval History     No acute events overnight.  Patient was pleasant.  Patient denies nausea or vomit.  Epigastric abdominal pain is improving.  Good tolerance to liquids.  Patient does not feel ready to go home today.      Physical Exam   Vital Signs: Temp: 99.7  F (37.6  C) Temp src: Oral BP: (!) 152/80 Pulse: 97   Resp: 18 SpO2: 92 % O2 Device: None (Room air)    Weight: 225 lbs 4.96 oz  General: aao x 3, NAD. Room air.   CVS:  NL s 1 and s2, no m/r/g.  Lungs: Normal respiratory effort, clear lungs, no crackles or wheezing.   Abd:  Soft, + bs, NT, no rebound or gaurding, no fluid shift.  Musculoskeletal: No calf tenderness to palpation.    Psychiatry:  Mood and affect appropriate.        Data         Imaging results reviewed over the past 24 hrs:   No results found for this or any previous visit (from the past 24 hour(s)).

## 2024-07-03 NOTE — PLAN OF CARE
Goal Outcome Evaluation:      Plan of Care Reviewed With: patient    Overall Patient Progress: improvingOverall Patient Progress: improving    Outcome Evaluation: Continues to have low appetite. Reporting pain improving, tolerating PO pain medication well. Continues to ambulate well independently. No fruther episodes of syncope since admission.

## 2024-07-03 NOTE — PLAN OF CARE
Goal Outcome Evaluation:      Plan of Care Reviewed With: patient    Overall Patient Progress: improvingOverall Patient Progress: improving    Pain improving per patient, managed well with oxy.   Appetite increasing, tolerating solids ok. IVFs stopped per hospitalist note.   Independent in room, steady on feet.   Passing gas. Voiding well.   R PIV x2 SL.     Hydralazine given x1 this evening for HTN.

## 2024-07-04 VITALS
TEMPERATURE: 98.1 F | RESPIRATION RATE: 16 BRPM | WEIGHT: 225.31 LBS | OXYGEN SATURATION: 92 % | SYSTOLIC BLOOD PRESSURE: 169 MMHG | DIASTOLIC BLOOD PRESSURE: 77 MMHG | BODY MASS INDEX: 30.52 KG/M2 | HEIGHT: 72 IN | HEART RATE: 86 BPM

## 2024-07-04 PROCEDURE — 250N000013 HC RX MED GY IP 250 OP 250 PS 637: Performed by: NURSE PRACTITIONER

## 2024-07-04 PROCEDURE — 250N000013 HC RX MED GY IP 250 OP 250 PS 637: Performed by: STUDENT IN AN ORGANIZED HEALTH CARE EDUCATION/TRAINING PROGRAM

## 2024-07-04 PROCEDURE — 99239 HOSP IP/OBS DSCHRG MGMT >30: CPT | Performed by: INTERNAL MEDICINE

## 2024-07-04 RX ORDER — OXYCODONE HYDROCHLORIDE 5 MG/1
5 TABLET ORAL EVERY 4 HOURS PRN
Qty: 15 TABLET | Refills: 0 | Status: SHIPPED | OUTPATIENT
Start: 2024-07-04

## 2024-07-04 RX ADMIN — ACETAMINOPHEN 650 MG: 325 TABLET, FILM COATED ORAL at 06:09

## 2024-07-04 RX ADMIN — HYDROCHLOROTHIAZIDE 12.5 MG: 12.5 TABLET ORAL at 08:54

## 2024-07-04 RX ADMIN — LISINOPRIL 40 MG: 40 TABLET ORAL at 08:54

## 2024-07-04 RX ADMIN — METOPROLOL SUCCINATE 100 MG: 100 TABLET, EXTENDED RELEASE ORAL at 08:54

## 2024-07-04 ASSESSMENT — ACTIVITIES OF DAILY LIVING (ADL)
ADLS_ACUITY_SCORE: 20

## 2024-07-04 NOTE — PLAN OF CARE
Pt. discharged at 1400 to home, and left with personal belongings. Pt. received complete discharge paperwork and oxycodone RX. Discharge teaching included oxycodone medication, pain management, activity restrictions, dressing changes. Dressing supplies sent home. Pt. to follow up with PCP in the next 7 days. Pt. had no further questions at the time of discharge and no unmet needs were identified.        Plan of Care Reviewed With: patient    Overall Patient Progress: improving

## 2024-07-04 NOTE — DISCHARGE SUMMARY
[facility]  Hospitalist Discharge Summary      Date of Admission:  6/30/2024  Date of Discharge:  7/4/2024  Discharging Provider: Simon Nunez MD  Discharge Service: Hospitalist Service, GOLD TEAM 17    Discharge Diagnoses     Possible alcohol induced acute pancreatitis   Syncope     Clinically Significant Risk Factors     # Obesity: Estimated body mass index is 30.56 kg/m  as calculated from the following:    Height as of this encounter: 1.829 m (6').    Weight as of this encounter: 102.2 kg (225 lb 5 oz).       Follow-ups Needed After Discharge   Follow-up Appointments     Adult [facility] Follow-up and recommended labs and tests      Follow up with primary care provider, Eli Sue, within 7 days for   hospital follow- up.  No follow up labs or test are needed.      Appointments on [place] and/or [facility] and [facility] (with [facility] or [facility]   provider or service). Call [phone] if you haven't heard regarding   these appointments within 7 days of discharge.            Unresulted Labs Ordered in the Past 30 Days of this Admission       No orders found from 5/31/2024 to 7/1/2024.            Discharge Disposition   Discharged to home  Condition at discharge: Stable    Hospital Course   Darron Schwarz is a 68 year old man admitted on 6/30/2024. He has a history of hypertension, hyperlipidemia, anxiety and depression and is admitted with acute pancreatitis.   Patient was drinking alcohol with friends on the day of admission when he developed back pain that transitioned to epigastric abdominal pain. Shortly after he became diaphoretic, passed out briefly and then woke up and vomited. He does confirm he has a history of vasovagal episodes, often with illness, exercise and pain as the triggers.   Patient was found with elevated lipase > 3000.  CT abdomen showed acute interstitial pancreatitis.  Patient was admitted to the medical floor and was started on IV fluids,  bowel rest and pain medication.  Lipase trended down and abdominal pain improved.  Nausea and vomit also improved and patient had good tolerance to liquids and then his diet was advanced until he had regular diet without any complications.  No further episodes of syncope or near syncope during this hospitalization.  Patient remained hemodynamically stable and afebrile.  I gave alcohol abuse counseling to the patient and he expressed understanding, he is stated that he will follow-up as outpatient with meetings.  No complications during this hospitalization and patient was hemodynamically stable and afebrile on the day of discharge.    Consultations This Hospital Stay   None    Code Status   Full Code    Time Spent on this Encounter   I, Simon Nunez MD, personally saw the patient today and spent greater than 30 minutes discharging this patient.       Simon Nunez MD  [facility] MED SURG  [address] [place] [id number]  Phone: [phone]  Fax: [phone]  ______________________________________________________________________    Physical Exam   Vital Signs: Temp: 98.1  F (36.7  C) Temp src: Oral BP: (!) 169/77 Pulse: 86   Resp: 16 SpO2: 92 % O2 Device: None (Room air)    Weight: 225 lbs 4.96 oz  General: aao x 3, NAD. Room air.   CVS: RRR, normal heart sounds, no murmur auscultated.  Lungs: Normal respiratory effort, clear lungs, no crackles or wheezing.         Abd:  Soft, + bs, NT, no rebound or gaurding, no fluid shift.  Musculoskeletal: No calf tenderness to palpation.    Psychiatry:  Mood and affect appropriate.       Primary Care Physician   Eli Sue    Discharge Orders      Reason for your hospital stay    Darron Schwarz is a 68 year old man admitted on 6/30/2024. He has a history of hypertension, hyperlipidemia, anxiety and depression.  Admitted with acute pancreatitis.     Activity    Your activity upon discharge: activity as tolerated     Adult  [facility] Follow-up and recommended labs and tests    Follow up with primary care provider, Eli Sue, within 7 days for hospital follow- up.  No follow up labs or test are needed.      Appointments on [place] and/or [facility] (with [facility] or [facility] provider or service). Call [phone] if you haven't heard regarding these appointments within 7 days of discharge.     Diet    Follow this diet upon discharge: Orders Placed This Encounter      Low Saturated Fat Diet       Significant Results and Procedures   Results for orders placed or performed during the hospital encounter of 06/30/24   XR Chest Port 1 View    Narrative    Exam: XR CHEST PORT 1 VIEW, 6/30/2024 5:37 PM    Indication: STEMI.    Comparison: None.    Findings:   Portable supine AP view of the chest. Mediastinal surgical clips.  Trachea is midline. Cardiomediastinal silhouette is within normal  limits. No focal airspace opacity. Streaky perihilar and bibasilar  opacities. No appreciable pneumothorax or pleural effusion. Visualized  portions of the upper abdomen are unremarkable. No acute osseous  abnormality.      Impression    Impression: Streaky perihilar and bibasilar opacities, likely  atelectasis and/or pulmonary edema.    I have personally reviewed the examination and initial interpretation  and I agree with the findings.    ANGELIKA DUFF MD         SYSTEM ID:  [id number]   CTA Chest Abdomen Pelvis w Contrast     Value    Radiologist flags Pancreatitis (Urgent)    Narrative    EXAM: CT chest, abdomen, and pelvis without and with intravenous  contrast. 6/30/2024 7:36 PM    HISTORY: epigastric pain; concern for dissection    TECHNIQUE: Helical acquisition of image data was performed for the  chest, abdomen, and pelvis without and with intravenous contrast.    COMPARISON: None.    FINDINGS:    VASCULATURE:  Post surgical changes of aortic coarctation repair. Normal caliber  great vessels. Major branches are patent. No aneurysms. No  focal  stenoses.    CHEST:  Lungs: The central airways are clear. No suspicious nodules. The right  dependent atelectasis with solitary pulmonary cyst. No focal airspace  consolidation. No pleural effusions. No pneumothorax.     Mediastinum: Normal thyroid. No lymphadenopathy. Normal heart. Normal  esophagus.    ABDOMEN/PELVIS:    Hepatobiliary: No masses. Hepatic steatosis. Cholelithiasis. Common  bile duct is normal in caliber.    Pancreas: Fatty atrophy of the pancreas. Peripancreatic fat stranding  and fluid.    Spleen: Normal.     Adrenal glands: Normal.    Genitourinary: Multiple nonobstructive punctate renal calculi  bilaterally, largest measuring up to 2 mm. No hydronephrosis. Prostate  calcifications.    Gastrointestinal: No dilated bowel.    Other: No free air. No free fluid.    BONES/SOFT TISSUE: Degenerative changes of the spine. No acute or  suspicious osseous lesions. Fat-containing left inguinal hernia.        Impression    IMPRESSION:  1. No evidence of dissection.  2. Acute interstitial pancreatitis.      [Urgent Result: Pancreatitis]    Finding was identified on 6/30/2024 7:40 PM.     Dr. Tineo was contacted by Dr. Stephenson at 6/30/2024 8:02 PM and  verbalized understanding of the urgent finding.     I have personally reviewed the examination and initial interpretation  and I agree with the findings.    ANGELIKA DUFF MD         SYSTEM ID:  [id number]   US Abdomen Limited (RUQ)    Narrative    EXAM: Right upper quadrant abdominal ultrasound 6/30/2024 10:11 PM     HISTORY: acute pancreatitis; evaluate for gallbladder    COMPARISON: Same date CTA    TECHNIQUE: The abdomen was scanned in standard fashion with  specialized ultrasound transducer(s) using both grey scale and limited  color Doppler techniques.    FINDINGS:   Limited acoustic penetration secondary to habitus.    LIVER: The liver demonstrates diffusely increased echogenicity and  measures 11.2 cm in craniocaudal dimension. There is no  evidence of a  focal hepatic mass. The main portal vein is patent with antegrade  flow.    GALLBLADDER: Multiple echogenic shadowing gallstones are present.  There is no wall thickening, pericholecystic fluid, sonographic  Alex's sign, or cholelithiasis.     BILE DUCTS: Both the intra- and extrahepatic biliary system are of  normal caliber. The common bile duct measures 3 mm in diameter.    PANCREAS: Not visualized.    RIGHT KIDNEY: The right kidney measures 11.2 cm in length. Normal  appearing renal parenchymal echogenicity and corticomedullary  differentiation. No hydronephrosis, mass, or calculus.     AORTA/IVC: The visualized portions of the aorta and IVC appear  unremarkable.     FLUID: No ascites or pleural effusions.       Impression    IMPRESSION:   1. Cholelithiasis without acute cholecystitis. Normal diameter of the  common bile duct. No evidence of choledocholithiasis.  2. Diffuse hepatic steatosis.    I have personally reviewed the examination and initial interpretation  and I agree with the findings.    JUAN SINGH MD         SYSTEM ID:  [id number]       Discharge Medications   Current Discharge Medication List        START taking these medications    Details   oxyCODONE (ROXICODONE) 5 MG tablet Take 1 tablet (5 mg) by mouth every 4 hours as needed for moderate pain  Qty: 15 tablet, Refills: 0    Associated Diagnoses: Other acute pancreatitis without infection or necrosis           CONTINUE these medications which have NOT CHANGED    Details   fenofibrate (LOFIBRA) 54 MG tablet TAKE 1 TABLET(54 MG) BY MOUTH DAILY  Qty: 90 tablet, Refills: 1    Associated Diagnoses: Hypertriglyceridemia      hydroCHLOROthiazide 12.5 MG tablet TAKE 1 TABLET BY MOUTH DAILY  Qty: 90 tablet, Refills: 1    Associated Diagnoses: Essential hypertension with goal blood pressure less than 140/90      metoprolol succinate ER (TOPROL XL) 100 MG 24 hr tablet Take 1 tablet (100 mg) by mouth daily  Qty: 90 tablet, Refills: 1     Associated Diagnoses: Essential hypertension with goal blood pressure less than 140/90      VITAMIN C 500 MG PO TABS 1 TABLET DAILY      lisinopril (ZESTRIL) 40 MG tablet Take 1 tablet (40 mg) by mouth daily  Qty: 90 tablet, Refills: 1    Associated Diagnoses: Essential hypertension with goal blood pressure less than 140/90           Allergies   Allergies   Allergen Reactions    Atorvastatin Calcium      malaise

## 2024-07-04 NOTE — PLAN OF CARE
Goal Outcome Evaluation:      Plan of Care Reviewed With: patient    Overall Patient Progress: improvingOverall Patient Progress: improving    Outcome Evaluation: Patient reporting pain improving. Alternating tylenol with oxycodone. BP continues to be elevated, requiring PRN hydralazine.

## 2024-07-05 ENCOUNTER — PATIENT OUTREACH (OUTPATIENT)
Dept: FAMILY MEDICINE | Facility: CLINIC | Age: 69
End: 2024-07-05

## 2024-07-05 NOTE — TELEPHONE ENCOUNTER
Transitions of Care Outreach  Chief Complaint   Patient presents with    Hospital F/U       Most Recent Admission Date: 6/30/2024   Most Recent Admission Diagnosis: Syncope, unspecified syncope type - R55  Other acute pancreatitis without infection or necrosis - K85.80     Most Recent Discharge Date: 7/4/2024   Most Recent Discharge Diagnosis: Syncope, unspecified syncope type - R55  Other acute pancreatitis without infection or necrosis - K85.80  Sinus bradycardia - R00.1     Transitions of Care Assessment    Discharge Assessment  How are you doing now that you are home?: Doing better  How are your symptoms? (Red Flag symptoms escalate to triage hotline per guidelines): Improved  Do you know how to contact your clinic care team if you have future questions or changes to your health status? : Yes  Does the patient have their discharge instructions? : Yes  Does the patient have questions regarding their discharge instructions? : No  Were you started on any new medications or were there changes to any of your previous medications? : Yes  Does the patient have all of their medications?: Yes  Do you have questions regarding any of your medications? : No  Do you have all of your needed medical supplies or equipment (DME)?  (i.e. oxygen tank, CPAP, cane, etc.): Yes    Follow up Plan     Discharge Follow-Up  Discharge follow up appointment scheduled in alignment with recommended follow up timeframe or Transitions of Risk Category? (Low = within 30 days; Moderate= within 14 days; High= within 7 days): Yes  Discharge Follow Up Appointment Date: 07/10/24  Discharge Follow Up Appointment Scheduled with?: Primary Care Provider    Future Appointments   Date Time Provider Department Center   7/10/2024 10:30 AM Eli Sue MD [facility]       Outpatient Plan as outlined on AVS reviewed with patient.    For any urgent concerns, please contact our 24 hour nurse triage line: [phone])       Toño Barone RN

## 2024-07-10 ENCOUNTER — OFFICE VISIT (OUTPATIENT)
Dept: FAMILY MEDICINE | Facility: CLINIC | Age: 69
End: 2024-07-10

## 2024-07-10 VITALS
OXYGEN SATURATION: 97 % | WEIGHT: 219.2 LBS | HEART RATE: 75 BPM | BODY MASS INDEX: 30.69 KG/M2 | TEMPERATURE: 98 F | RESPIRATION RATE: 16 BRPM | HEIGHT: 71 IN | DIASTOLIC BLOOD PRESSURE: 80 MMHG | SYSTOLIC BLOOD PRESSURE: 150 MMHG

## 2024-07-10 DIAGNOSIS — K85.90 ACUTE PANCREATITIS, UNSPECIFIED COMPLICATION STATUS, UNSPECIFIED PANCREATITIS TYPE: Primary | ICD-10-CM

## 2024-07-10 PROCEDURE — 99213 OFFICE O/P EST LOW 20 MIN: CPT | Performed by: FAMILY MEDICINE

## 2024-07-10 RX ORDER — RESPIRATORY SYNCYTIAL VIRUS VACCINE 120MCG/0.5
0.5 KIT INTRAMUSCULAR ONCE
Qty: 1 EACH | Refills: 0 | Status: CANCELLED | OUTPATIENT
Start: 2024-07-10 | End: 2024-07-10

## 2024-07-10 ASSESSMENT — PAIN SCALES - GENERAL: PAINLEVEL: MILD PAIN (3)

## 2024-07-10 NOTE — PROGRESS NOTES
"  Assessment & Plan     Acute pancreatitis, unspecified complication status, unspecified pancreatitis type  He is doing much better now , ED visit on June 30, for acute pancreatitis . Now pain free and has been eatinggg       g  MED REC REQUIRED  Post Medication Reconciliation Status: discharge medications reconciled, continue medications without change  BMI  Estimated body mass index is 30.36 kg/m  as calculated from the following:    Height as of this encounter: 1.81 m (5' 11.25\").    Weight as of this encounter: 99.4 kg (219 lb 3.2 oz).         RTC if no improving or worsening.  Pt is aware  and comfortable with the current plan.      Santiago Posey is a 68 year old, presenting for the following health issues:  No chief complaint on file.    [facility]        Hospital Follow-up Visit:  April of this yr sold house and was buying a condo , will be renting it but lost job at [employer] , was laid off , now renting apartment, not buying the condo , is now near the [place], likes the [facility]: [facility]  Date of Admission: 6/30/2024  Date of Discharge: 7/4/2024  Reason(s) for Admission: Possible alcohol induced acute pancreatitis, syncope  Was the patient in the ICU or did the patient experience delirium during hospitalization?  No  Do you have any other stressors you would like to discuss with your provider? No    Problems taking medications regularly:  None  Medication changes since discharge: (START) oxycodone 5 mg tablets  Problems adhering to non-medication therapy:  None    Summary of hospitalization:  [facility] discharge summary reviewed  Diagnostic Tests/Treatments reviewed.  Follow up needed: none  Other Healthcare Providers Involved in Patient s Care:         None  Update since discharge: improved.         Plan of care communicated with patient           Eating food now and no pain , no diarrhea back to normal " right knee gout flared up advil with food     2 months ago same thing happen at home and got   Review of Systems  Constitutional, HEENT, cardiovascular, pulmonary, GI, , musculoskeletal, neuro, skin, endocrine and psych systems are negative, except as otherwise noted.      Objective    There were no vitals taken for this visit.  There is no height or weight on file to calculate BMI.  Physical Exam   GENERAL: alert and no distress  EYES: Eyes grossly normal to inspection, PERRL and conjunctivae and sclerae normal  NECK: no adenopathy, no asymmetry, masses, or scars  RESP: lungs clear to auscultation - no rales, rhonchi or wheezes  CV: regular rate and rhythm, normal S1 S2, no S3 or S4, no murmur, click or rub, no peripheral edema  ABDOMEN: soft, nontender, no hepatosplenomegaly, no masses and bowel sounds normal  MS: no gross musculoskeletal defects noted, no edema    No results found for any visits on 07/10/24.        Signed Electronically by: Eli Sue MD

## 2024-08-15 DIAGNOSIS — I10 ESSENTIAL HYPERTENSION WITH GOAL BLOOD PRESSURE LESS THAN 140/90: ICD-10-CM

## 2024-08-15 RX ORDER — METOPROLOL SUCCINATE 100 MG/1
100 TABLET, EXTENDED RELEASE ORAL DAILY
Qty: 90 TABLET | Refills: 1 | Status: SHIPPED | OUTPATIENT
Start: 2024-08-15

## 2024-08-15 NOTE — TELEPHONE ENCOUNTER
[place] called.   [place]  Forgot medication at home.   Needs prescription sent to pharmacy.   Will be gone for over 10 days.  New pharmacy T'd up.   Thanks!  Lis PEREZ

## 2024-09-24 DIAGNOSIS — I10 ESSENTIAL HYPERTENSION WITH GOAL BLOOD PRESSURE LESS THAN 140/90: ICD-10-CM

## 2024-09-24 DIAGNOSIS — E78.1 HYPERTRIGLYCERIDEMIA: ICD-10-CM

## 2024-09-24 RX ORDER — HYDROCHLOROTHIAZIDE 12.5 MG/1
TABLET ORAL
Qty: 90 TABLET | Refills: 1 | Status: SHIPPED | OUTPATIENT
Start: 2024-09-24

## 2024-09-24 RX ORDER — FENOFIBRATE 54 MG/1
TABLET ORAL
Qty: 90 TABLET | Refills: 1 | Status: SHIPPED | OUTPATIENT
Start: 2024-09-24

## 2024-09-24 RX ORDER — LISINOPRIL 40 MG/1
40 TABLET ORAL DAILY
Qty: 90 TABLET | Refills: 1 | Status: SHIPPED | OUTPATIENT
Start: 2024-09-24

## 2025-03-19 DIAGNOSIS — I10 ESSENTIAL HYPERTENSION WITH GOAL BLOOD PRESSURE LESS THAN 140/90: ICD-10-CM

## 2025-03-20 RX ORDER — METOPROLOL SUCCINATE 100 MG/1
100 TABLET, EXTENDED RELEASE ORAL DAILY
Qty: 30 TABLET | Refills: 1 | Status: SHIPPED | OUTPATIENT
Start: 2025-03-20

## 2025-03-28 DIAGNOSIS — I10 ESSENTIAL HYPERTENSION WITH GOAL BLOOD PRESSURE LESS THAN 140/90: ICD-10-CM

## 2025-03-28 DIAGNOSIS — E78.1 HYPERTRIGLYCERIDEMIA: ICD-10-CM

## 2025-03-31 RX ORDER — LISINOPRIL 40 MG/1
40 TABLET ORAL DAILY
Qty: 30 TABLET | Refills: 0 | Status: SHIPPED | OUTPATIENT
Start: 2025-03-31

## 2025-03-31 RX ORDER — HYDROCHLOROTHIAZIDE 12.5 MG/1
TABLET ORAL
Qty: 30 TABLET | Refills: 0 | Status: SHIPPED | OUTPATIENT
Start: 2025-03-31

## 2025-03-31 RX ORDER — FENOFIBRATE 54 MG/1
TABLET ORAL
Qty: 90 TABLET | Refills: 1 | Status: SHIPPED | OUTPATIENT
Start: 2025-03-31

## 2025-04-18 ENCOUNTER — OFFICE VISIT (OUTPATIENT)
Dept: FAMILY MEDICINE | Facility: CLINIC | Age: 70
End: 2025-04-18
Payer: COMMERCIAL

## 2025-04-18 DIAGNOSIS — E78.1 HYPERTRIGLYCERIDEMIA: ICD-10-CM

## 2025-04-18 DIAGNOSIS — Z12.5 SCREENING FOR PROSTATE CANCER: ICD-10-CM

## 2025-04-18 DIAGNOSIS — I10 ESSENTIAL HYPERTENSION WITH GOAL BLOOD PRESSURE LESS THAN 140/90: ICD-10-CM

## 2025-04-18 DIAGNOSIS — F33.1 MODERATE EPISODE OF RECURRENT MAJOR DEPRESSIVE DISORDER (H): ICD-10-CM

## 2025-04-18 DIAGNOSIS — Z23 ENCOUNTER FOR IMMUNIZATION: ICD-10-CM

## 2025-04-18 DIAGNOSIS — E78.5 HYPERLIPIDEMIA LDL GOAL <130: ICD-10-CM

## 2025-04-18 DIAGNOSIS — R73.01 ELEVATED FASTING GLUCOSE: ICD-10-CM

## 2025-04-18 DIAGNOSIS — Z00.00 ENCOUNTER FOR ROUTINE ADULT HEALTH EXAMINATION WITHOUT ABNORMAL FINDINGS: Primary | ICD-10-CM

## 2025-04-18 LAB
EST. AVERAGE GLUCOSE BLD GHB EST-MCNC: 103 MG/DL
HBA1C MFR BLD: 5.2 % (ref 0–5.6)

## 2025-04-18 PROCEDURE — 80053 COMPREHEN METABOLIC PANEL: CPT | Performed by: FAMILY MEDICINE

## 2025-04-18 PROCEDURE — 36415 COLL VENOUS BLD VENIPUNCTURE: CPT | Performed by: FAMILY MEDICINE

## 2025-04-18 PROCEDURE — 3078F DIAST BP <80 MM HG: CPT | Performed by: FAMILY MEDICINE

## 2025-04-18 PROCEDURE — 83036 HEMOGLOBIN GLYCOSYLATED A1C: CPT | Performed by: FAMILY MEDICINE

## 2025-04-18 PROCEDURE — 90480 ADMN SARSCOV2 VAC 1/ONLY CMP: CPT | Performed by: FAMILY MEDICINE

## 2025-04-18 PROCEDURE — G0103 PSA SCREENING: HCPCS | Performed by: FAMILY MEDICINE

## 2025-04-18 PROCEDURE — 3075F SYST BP GE 130 - 139MM HG: CPT | Performed by: FAMILY MEDICINE

## 2025-04-18 PROCEDURE — 99214 OFFICE O/P EST MOD 30 MIN: CPT | Mod: 25 | Performed by: FAMILY MEDICINE

## 2025-04-18 PROCEDURE — 91320 SARSCV2 VAC 30MCG TRS-SUC IM: CPT | Performed by: FAMILY MEDICINE

## 2025-04-18 PROCEDURE — 99397 PER PM REEVAL EST PAT 65+ YR: CPT | Performed by: FAMILY MEDICINE

## 2025-04-18 PROCEDURE — 80061 LIPID PANEL: CPT | Performed by: FAMILY MEDICINE

## 2025-04-18 PROCEDURE — 1126F AMNT PAIN NOTED NONE PRSNT: CPT | Performed by: FAMILY MEDICINE

## 2025-04-18 RX ORDER — AMLODIPINE BESYLATE 2.5 MG/1
2.5 TABLET ORAL DAILY
Qty: 30 TABLET | Refills: 1 | Status: SHIPPED | OUTPATIENT
Start: 2025-04-18

## 2025-04-18 SDOH — HEALTH STABILITY: PHYSICAL HEALTH: ON AVERAGE, HOW MANY MINUTES DO YOU ENGAGE IN EXERCISE AT THIS LEVEL?: 60 MIN

## 2025-04-18 SDOH — HEALTH STABILITY: PHYSICAL HEALTH: ON AVERAGE, HOW MANY DAYS PER WEEK DO YOU ENGAGE IN MODERATE TO STRENUOUS EXERCISE (LIKE A BRISK WALK)?: 7 DAYS

## 2025-04-18 ASSESSMENT — PATIENT HEALTH QUESTIONNAIRE - PHQ9
10. IF YOU CHECKED OFF ANY PROBLEMS, HOW DIFFICULT HAVE THESE PROBLEMS MADE IT FOR YOU TO DO YOUR WORK, TAKE CARE OF THINGS AT HOME, OR GET ALONG WITH OTHER PEOPLE: NOT DIFFICULT AT ALL
SUM OF ALL RESPONSES TO PHQ QUESTIONS 1-9: 0
SUM OF ALL RESPONSES TO PHQ QUESTIONS 1-9: 0

## 2025-04-18 ASSESSMENT — SOCIAL DETERMINANTS OF HEALTH (SDOH): HOW OFTEN DO YOU GET TOGETHER WITH FRIENDS OR RELATIVES?: ONCE A WEEK

## 2025-04-18 ASSESSMENT — PAIN SCALES - GENERAL: PAINLEVEL_OUTOF10: NO PAIN (0)

## 2025-04-18 NOTE — PATIENT INSTRUCTIONS
Patient Education   Preventive Care Advice   This is general advice given by our system to help you stay healthy. However, your care team may have specific advice just for you. Please talk to your care team about your preventive care needs.  Nutrition  Eat 5 or more servings of fruits and vegetables each day.  Try wheat bread, brown rice and whole grain pasta (instead of white bread, rice, and pasta).  Get enough calcium and vitamin D. Check the label on foods and aim for 100% of the RDA (recommended daily allowance).  Lifestyle  Exercise at least 150 minutes each week  (30 minutes a day, 5 days a week).  Do muscle strengthening activities 2 days a week. These help control your weight and prevent disease.  No smoking.  Wear sunscreen to prevent skin cancer.  Have a dental exam and cleaning every 6 months.  Yearly exams  See your health care team every year to talk about:  Any changes in your health.  Any medicines your care team has prescribed.  Preventive care, family planning, and ways to prevent chronic diseases.  Shots (vaccines)   HPV shots (up to age 26), if you've never had them before.  Hepatitis B shots (up to age 59), if you've never had them before.  COVID-19 shot: Get this shot when it's due.  Flu shot: Get a flu shot every year.  Tetanus shot: Get a tetanus shot every 10 years.  Pneumococcal, hepatitis A, and RSV shots: Ask your care team if you need these based on your risk.  Shingles shot (for age 50 and up)  General health tests  Diabetes screening:  Starting at age 35, Get screened for diabetes at least every 3 years.  If you are younger than age 35, ask your care team if you should be screened for diabetes.  Cholesterol test: At age 39, start having a cholesterol test every 5 years, or more often if advised.  Bone density scan (DEXA): At age 50, ask your care team if you should have this scan for osteoporosis (brittle bones).  Hepatitis C: Get tested at least once in your life.  STIs (sexually  transmitted infections)  Before age 24: Ask your care team if you should be screened for STIs.  After age 24: Get screened for STIs if you're at risk. You are at risk for STIs (including HIV) if:  You are sexually active with more than one person.  You don't use condoms every time.  You or a partner was diagnosed with a sexually transmitted infection.  If you are at risk for HIV, ask about PrEP medicine to prevent HIV.  Get tested for HIV at least once in your life, whether you are at risk for HIV or not.  Cancer screening tests  Cervical cancer screening: If you have a cervix, begin getting regular cervical cancer screening tests starting at age 21.  Breast cancer scan (mammogram): If you've ever had breasts, begin having regular mammograms starting at age 40. This is a scan to check for breast cancer.  Colon cancer screening: It is important to start screening for colon cancer at age 45.  Have a colonoscopy test every 10 years (or more often if you're at risk) Or, ask your provider about stool tests like a FIT test every year or Cologuard test every 3 years.  To learn more about your testing options, visit:   .  For help making a decision, visit:   https://bit.ly/ar11015.  Prostate cancer screening test: If you have a prostate, ask your care team if a prostate cancer screening test (PSA) at age 55 is right for you.  Lung cancer screening: If you are a current or former smoker ages 50 to 80, ask your care team if ongoing lung cancer screenings are right for you.  For informational purposes only. Not to replace the advice of your health care provider. Copyright   2023 [facility]. All rights reserved. Clinically reviewed by the [facility] Transitions Program. [employer] 071587 - REV 01/24.  Learning About Activities of Daily Living  What are activities of daily living?     Activities of daily living (ADLs) are the basic self-care tasks you do every day. These include eating, bathing, dressing,  and moving around.  As you age, and if you have health problems, you may find that it's harder to do some of these tasks. If so, your doctor can suggest ideas that may help.  To measure what kind of help you may need, your doctor will ask how well you are able to do ADLs. Let your doctor know if there are any tasks that you are having trouble doing. This is an important first step to getting help. And when you have the help you need, you can stay as independent as possible.  How will a doctor assess your ADLs?  Asking about ADLs is part of a routine health checkup your doctor will likely do as you age. Your health check might be done in a doctor's office, in your home, or at a hospital. The goal is to find out if you are having any problems that could make it hard to care for yourself or that make it unsafe for you to be on your own.  To measure your ADLs, your doctor will ask how hard it is for you to do routine tasks. Your doctor may also want to know if you have changed the way you do a task because of a health problem. Your doctor may watch how you:  Walk back and forth.  Keep your balance while you stand or walk.  Move from sitting to standing or from a bed to a chair.  Button or unbutton a shirt or sweater.  Remove and put on your shoes.  It's common to feel a little worried or anxious if you find you can't do all the things you used to be able to do. Talking with your doctor about ADLs is a way to make sure you're as safe as possible and able to care for yourself as well as you can. You may want to bring a caregiver, friend, or family member to your checkup. They can help you talk to your doctor.  Follow-up care is a key part of your treatment and safety. Be sure to make and go to all appointments, and call your doctor if you are having problems. It's also a good idea to know your test results and keep a list of the medicines you take.  Current as of: October 24, 2024  Content Version: 14.4    6435-9460  [employer].   Care instructions adapted under license by your healthcare professional. If you have questions about a medical condition or this instruction, always ask your healthcare professional. [employer] disclaims any warranty or liability for your use of this information.

## 2025-04-18 NOTE — PROGRESS NOTES
Preventive Care Visit  [facility]  Eli Sue MD, Family Medicine  Apr 18, 2025      Assessment & Plan     Encounter for routine adult health examination without abnormal findings  After last year's pancreatitis episode he quit drinking alcohol and has lost about 16 lbs , doing better   Will check fasting labs as below   - PRIMARY CARE FOLLOW-UP SCHEDULING; Future  - REVIEW OF HEALTH MAINTENANCE PROTOCOL ORDERS    Moderate episode of recurrent major depressive disorder (H)  Currently is doing well , not on anti depressants     Essential hypertension with goal blood pressure less than 140/90  Will check CMP and I have refilled his anti HTN medications   - Comprehensive metabolic panel (BMP + Alb, Alk Phos, ALT, AST, Total. Bili, TP); Future  - amLODIPine (NORVASC) 2.5 MG tablet; Take 1 tablet (2.5 mg) by mouth daily.  - Comprehensive metabolic panel (BMP + Alb, Alk Phos, ALT, AST, Total. Bili, TP)  He will need to monitor his Bp at home     Hypertriglyceridemia  Will check fasting lipids and I have refilled his medication , no side effects   - Comprehensive metabolic panel (BMP + Alb, Alk Phos, ALT, AST, Total. Bili, TP); Future  - Lipid panel reflex to direct LDL Fasting; Future  - Lipid panel reflex to direct LDL Fasting  - Comprehensive metabolic panel (BMP + Alb, Alk Phos, ALT, AST, Total. Bili, TP)    Hyperlipidemia LDL goal <130  As above   - Comprehensive metabolic panel (BMP + Alb, Alk Phos, ALT, AST, Total. Bili, TP); Future  - Lipid panel reflex to direct LDL Fasting; Future  - Lipid panel reflex to direct LDL Fasting  - Comprehensive metabolic panel (BMP + Alb, Alk Phos, ALT, AST, Total. Bili, TP)    Screening for prostate cancer  Screen   - PSA, screen; Future  - PSA, screen    Elevated fasting glucose  Screen   - Hemoglobin A1c; Future  - Hemoglobin A1c    Encounter for immunization  He got his covid booster updated   - COVID-19 12+ (PFIZER)    Patient has been advised of split  "billing requirements and indicates understanding: Yes        BMI  Estimated body mass index is 28.42 kg/m  as calculated from the following:    Height as of this encounter: 1.803 m (5' 11\").    Weight as of this encounter: 92.4 kg (203 lb 12.8 oz).       Counseling  Appropriate preventive services were addressed with this patient via screening, questionnaire, or discussion as appropriate for fall prevention, nutrition, physical activity, Tobacco-use cessation, social engagement, weight loss and cognition.  Checklist reviewing preventive services available has been given to the patient.  Reviewed patient's diet, addressing concerns and/or questions.   Updated plan of care.  Patient reported difficulty with activities of daily living were addressed today.Patient reported safety concerns were addressed today.        Santiago Posey is a 69 year old, presenting for the following:  Physical (AWV)        4/18/2025     1:36 PM   Additional Questions   Roomed by Ethel BOX           HPI    Wellness Visit Notes:   Walks daily quit drinking since July 2024   Works at [employer] job     -Colon Cancer Screening: Last done via colonoscopy on 10/2022. (Impression: Normal, no specimens collected. Repeat 10 years.). Due 10/2032.   -PSA: Last done 3/2024 (Result: 1.27). Discussed risks/benefits of PSA testing today in detail, including possibility of false positive results and/or possibility of biopsy recommended as next step. Patient requesting PSA lab work.    -Dermatology: Pt verbalized they do not meet with dermatology regularly. .    -Immunizations: Patient is due/able to receive at clinic today: Covid Booster  - Today  Patient is due for the following vaccines: Hepatitis A. Advised patient to check with insurance for coverage. Patient is willing to pay out of pocket to receive in clinic.   140 over 70    1      Advance Care Planning    Discussed advance care planning with patient; informed AVS has link to Honoring " Choices.        4/18/2025   General Health   How would you rate your overall physical health? Good   Feel stress (tense, anxious, or unable to sleep) Only a little   (!) STRESS CONCERN      4/18/2025   Nutrition   Diet: Regular (no restrictions)         4/18/2025   Exercise   Days per week of moderate/strenous exercise 7 days   Average minutes spent exercising at this level 60 min         4/18/2025   Social Factors   Frequency of gathering with friends or relatives Once a week   Worry food won't last until get money to buy more No   Food not last or not have enough money for food? No   Do you have housing? (Housing is defined as stable permanent housing and does not include staying ouside in a car, in a tent, in an abandoned building, in an overnight shelter, or couch-surfing.) Yes   Are you worried about losing your housing? No   Lack of transportation? No   Unable to get utilities (heat,electricity)? No         4/18/2025   Fall Risk   Fallen 2 or more times in the past year? No    Trouble with walking or balance? No        Proxy-reported          4/18/2025   Activities of Daily Living- Home Safety   Needs help with the following daily activites Telephone use   Safety concerns in the home No grab bars in the bathroom         4/18/2025   Dental   Dentist two times every year? Yes         4/18/2025   Hearing Screening   Hearing concerns? None of the above         4/18/2025   Driving Risk Screening   Patient/family members have concerns about driving No         4/18/2025   General Alertness/Fatigue Screening   Have you been more tired than usual lately? No         4/18/2025   Urinary Incontinence Screening   Bothered by leaking urine in past 6 months No       Today's PHQ-9 Score:       4/18/2025     1:26 PM   PHQ-9 SCORE   PHQ-9 Total Score MyChart 0   PHQ-9 Total Score 0        Patient-reported         4/18/2025   Substance Use   Alcohol more than 3/day or more than 7/wk Not Applicable   Do you have a current opioid  prescription? No   How severe/bad is pain from 1 to 10? 0/10 (No Pain)   Do you use any other substances recreationally? No     Social History     Tobacco Use    Smoking status: Never    Smokeless tobacco: Never   Vaping Use    Vaping status: Never Used   Substance Use Topics    Alcohol use: Yes     Alcohol/week: 0.0 standard drinks of alcohol     Comment: 5-6 drinks per week    Drug use: No           4/18/2025   AAA Screening   Family history of Abdominal Aortic Aneurysm (AAA)? No   Last PSA:   PSA   Date Value Ref Range Status   09/04/2018 2.76 0 - 4 ug/L Final     Comment:     Assay Method:  Chemiluminescence using Siemens Vista analyzer     Prostate Specific Antigen Screen   Date Value Ref Range Status   03/29/2024 1.27 0.00 - 4.50 ng/mL Final   09/09/2022 0.81 0.00 - 4.00 ug/L Final     ASCVD Risk   The 10-year ASCVD risk score (Bailee FORRESTER, et al., 2019) is: 30.6%    Values used to calculate the score:      Age: 69 years      Sex: Male      Is Non- : No      Diabetic: No      Tobacco smoker: No      Systolic Blood Pressure: 150 mmHg      Is BP treated: Yes      HDL Cholesterol: 34 mg/dL      Total Cholesterol: 215 mg/dL            Reviewed and updated as needed this visit by Provider                    Past Medical History:   Diagnosis Date    Anxiety state, unspecified     Depressive disorder 1/2012    Related to ending of relationship.  No longer taking meds    Other and unspecified hyperlipidemia     Unspecified essential hypertension     Varicella without mention of complication      Past Surgical History:   Procedure Laterality Date    COLONOSCOPY  03/2009    COLONOSCOPY N/A 10/19/2022    Procedure: COLONOSCOPY;  Surgeon: Bruce Shearer MD;  Location:  GI    HC TOOTH EXTRACTION W/FORCEP  age 21    wisdom teeth    ZZC EXCISN COARCT AORTA DRCT ANAST      Hx aortic coaraction repair age 19     Lab work is in process  Labs reviewed in EPIC  BP Readings from Last 3 Encounters:    04/18/25 138/80   07/10/24 (!) 150/80   07/04/24 (!) 169/77    Wt Readings from Last 3 Encounters:   04/18/25 92.4 kg (203 lb 12.8 oz)   07/10/24 99.4 kg (219 lb 3.2 oz)   07/01/24 102.2 kg (225 lb 5 oz)                  Patient Active Problem List   Diagnosis    Hypertension goal BP (blood pressure) < 140/90    Hyperlipidemia LDL goal <130    Moderate major depression (H)    Adjustment disorder with anxiety    Essential hypertension with goal blood pressure less than 140/90    Moderate episode of recurrent major depressive disorder (H)    Acute gout involving toe of right foot, unspecified cause    Syncope, unspecified syncope type    Other acute pancreatitis without infection or necrosis     Past Surgical History:   Procedure Laterality Date    COLONOSCOPY  03/2009    COLONOSCOPY N/A 10/19/2022    Procedure: COLONOSCOPY;  Surgeon: Bruce Shearer MD;  Location: [facility]    HC TOOTH EXTRACTION W/FORCEP  age 21    wisdom teeth    ZZC EXCISN COARCT AORTA DRCT ANAST      Hx aortic coaraction repair age 19       Social History     Tobacco Use    Smoking status: Never    Smokeless tobacco: Never   Substance Use Topics    Alcohol use: Yes     Alcohol/week: 0.0 standard drinks of alcohol     Comment: 5-6 drinks per week     Family History   Problem Relation Age of Onset    Psychotic Disorder Mother     Breast Cancer Mother     Mental Illness Mother         Bipolar disorder    Alcohol/Drug Father     Hypertension Father     Other Cancer Father     Alcohol/Drug Brother     Hypertension Brother     Cardiovascular Sister     Hypertension Sister     Hyperlipidemia Sister     Cardiovascular Brother     Hypertension Sister     Hypertension Brother     Alzheimer Disease Sister          Current Outpatient Medications   Medication Sig Dispense Refill    amLODIPine (NORVASC) 2.5 MG tablet Take 1 tablet (2.5 mg) by mouth daily. 30 tablet 1    fenofibrate (LOFIBRA) 54 MG tablet TAKE 1 TABLET(54 MG) BY MOUTH DAILY 90 tablet 1     hydroCHLOROthiazide 12.5 MG tablet TAKE 1 TABLET BY MOUTH DAILY 90 tablet 1    lisinopril (ZESTRIL) 40 MG tablet Take 1 tablet (40 mg) by mouth daily. 90 tablet 1    metoprolol succinate ER (TOPROL XL) 100 MG 24 hr tablet Take 1 tablet (100 mg) by mouth daily. 90 tablet 1    VITAMIN C 500 MG PO TABS 1 TABLET DAILY       Allergies   Allergen Reactions    Atorvastatin Calcium      malaise     Recent Labs   Lab Test 04/18/25  1439 07/02/24  0804 07/01/24  1136 07/01/24  0653 06/30/24  1742 06/30/24  1739 03/29/24  1059 09/09/22  1459 09/09/22  1459 08/10/21  0849 08/17/18  0905 06/02/17  0948   A1C 5.2  --   --   --   --   --  5.3  --  5.4  --   --   --    *  --   --   --   --   --  134*  --  121*   < > 74 92   HDL 37*  --   --   --   --   --  34*  --  28*   < > 25* 37*   TRIG 76  --  172*  --   --   --  236*  --  254*   < > 304* 322*   ALT 14 104*  --   --  125*  --  25  --  46   < > 52 41   CR 1.00 0.87  --    < > 1.15  --  1.05  --  0.88   < > 0.85 0.96   GFRESTIMATED 81 >90  --    < > 69  --  77  --  >90   < > >90 79   GFRESTBLACK  --   --   --   --   --   --   --   --   --   --  >90 >90  African American GFR Calc     POTASSIUM 4.5 3.5  --    < > 3.9   < > 4.4   < > 3.8   < > 4.3 4.7    < > = values in this interval not displayed.           Current providers sharing in care for this patient include:  Patient Care Team:  Eli Sue MD as PCP - General (Family Practice)  Eli Sue MD as Assigned PCP    The following health maintenance items are reviewed in Epic and correct as of today:  Health Maintenance   Topic Date Due    HEPATITIS A IMMUNIZATION (1 of 2 - Risk 2-dose series) 09/06/1974    COVID-19 Vaccine (10 - 2024-25 season) 03/24/2025    LIPID  03/29/2025    ANNUAL REVIEW OF HM ORDERS  03/29/2025    MEDICARE ANNUAL WELLNESS VISIT  03/29/2025    BMP  07/02/2025    PHQ-9  10/18/2025    FALL RISK ASSESSMENT  04/18/2026    DIABETES SCREENING  07/02/2027    ADVANCE CARE PLANNING  09/11/2027     "DTAP/TDAP/TD IMMUNIZATION (4 - Td or Tdap) 09/09/2032    COLORECTAL CANCER SCREENING  10/19/2032    HEPATITIS C SCREENING  Completed    DEPRESSION ACTION PLAN  Completed    INFLUENZA VACCINE  Completed    Pneumococcal Vaccine: 50+ Years  Completed    ZOSTER IMMUNIZATION  Completed    HEPATITIS B IMMUNIZATION  Completed    RSV VACCINE  Completed    HPV IMMUNIZATION  Aged Out    MENINGITIS IMMUNIZATION  Aged Out         Review of Systems  Constitutional, HEENT, cardiovascular, pulmonary, GI, , musculoskeletal, neuro, skin, endocrine and psych systems are negative, except as otherwise noted.     Objective    Exam  There were no vitals taken for this visit.   Estimated body mass index is 30.36 kg/m  as calculated from the following:    Height as of 7/10/24: 1.81 m (5' 11.25\").    Weight as of 7/10/24: 99.4 kg (219 lb 3.2 oz).    Physical Exam  GENERAL: alert and no distress  EYES: Eyes grossly normal to inspection, PERRL and conjunctivae and sclerae normal  HENT: ear canals and TM's normal, nose and mouth without ulcers or lesions  NECK: no adenopathy, no asymmetry, masses, or scars  RESP: lungs clear to auscultation - no rales, rhonchi or wheezes  CV: regular rate and rhythm, normal S1 S2, no S3 or S4, no murmur, click or rub, no peripheral edema  ABDOMEN: soft, nontender, no hepatosplenomegaly, no masses and bowel sounds normal  MS: no gross musculoskeletal defects noted, no edema  SKIN: no suspicious lesions or rashes  NEURO: Normal strength and tone, mentation intact and speech normal  PSYCH: mentation appears normal, affect normal/bright        3/29/2024   Mini Cog   Clock Draw Score 2 Normal    2 Normal   3 Item Recall 3 objects recalled   Mini Cog Total Score 5       Multiple values from one day are sorted in reverse-chronological order              Signed Electronically by: Eli Sue MD    Answers submitted by the patient for this visit:  Patient Health Questionnaire (Submitted on 4/18/2025)  If you " checked off any problems, how difficult have these problems made it for you to do your work, take care of things at home, or get along with other people?: Not difficult at all  PHQ9 TOTAL SCORE: 0

## 2025-04-18 NOTE — NURSING NOTE
Prior to immunization administration, verified patients identity using patient s name and date of birth. Please see Immunization Activity for additional information.     Screening Questionnaire for Adult Immunization    Are you sick today?   No   Do you have allergies to medications, food, a vaccine component or latex?   No   Have you ever had a serious reaction after receiving a vaccination?   No   Do you have a long-term health problem with heart, lung, kidney, or metabolic disease (e.g., diabetes), asthma, a blood disorder, no spleen, complement component deficiency, a cochlear implant, or a spinal fluid leak?  Are you on long-term aspirin therapy?   No   Do you have cancer, leukemia, HIV/AIDS, or any other immune system problem?   No   Do you have a parent, brother, or sister with an immune system problem?   No   In the past 3 months, have you taken medications that affect  your immune system, such as prednisone, other steroids, or anticancer drugs; drugs for the treatment of rheumatoid arthritis, Crohn s disease, or psoriasis; or have you had radiation treatments?   No   Have you had a seizure, or a brain or other nervous system problem?   No   During the past year, have you received a transfusion of blood or blood    products, or been given immune (gamma) globulin or antiviral drug?   No   For women: Are you pregnant or is there a chance you could become       pregnant during the next month?   No   Have you received any vaccinations in the past 4 weeks?   No     Immunization questionnaire answers were all negative.      Patient instructed to remain in clinic for 15 minutes afterwards, and to report any adverse reactions.     Screening performed by Kait Woodall on 4/18/2025 at 2:32 PM.

## 2025-04-19 LAB
ALBUMIN SERPL BCG-MCNC: 4.3 G/DL (ref 3.5–5.2)
ALP SERPL-CCNC: 51 U/L (ref 40–150)
ALT SERPL W P-5'-P-CCNC: 14 U/L (ref 0–70)
ANION GAP SERPL CALCULATED.3IONS-SCNC: 15 MMOL/L (ref 7–15)
AST SERPL W P-5'-P-CCNC: 28 U/L (ref 0–45)
BILIRUB SERPL-MCNC: 0.5 MG/DL
BUN SERPL-MCNC: 16.7 MG/DL (ref 8–23)
CALCIUM SERPL-MCNC: 9.4 MG/DL (ref 8.8–10.4)
CHLORIDE SERPL-SCNC: 105 MMOL/L (ref 98–107)
CHOLEST SERPL-MCNC: 168 MG/DL
CREAT SERPL-MCNC: 1 MG/DL (ref 0.67–1.17)
EGFRCR SERPLBLD CKD-EPI 2021: 81 ML/MIN/1.73M2
FASTING STATUS PATIENT QL REPORTED: YES
FASTING STATUS PATIENT QL REPORTED: YES
GLUCOSE SERPL-MCNC: 87 MG/DL (ref 70–99)
HCO3 SERPL-SCNC: 21 MMOL/L (ref 22–29)
HDLC SERPL-MCNC: 37 MG/DL
LDLC SERPL CALC-MCNC: 116 MG/DL
NONHDLC SERPL-MCNC: 131 MG/DL
POTASSIUM SERPL-SCNC: 4.5 MMOL/L (ref 3.4–5.3)
PROT SERPL-MCNC: 7.1 G/DL (ref 6.4–8.3)
PSA SERPL DL<=0.01 NG/ML-MCNC: 0.86 NG/ML (ref 0–4.5)
SODIUM SERPL-SCNC: 141 MMOL/L (ref 135–145)
TRIGL SERPL-MCNC: 76 MG/DL

## 2025-04-21 VITALS
TEMPERATURE: 97.1 F | DIASTOLIC BLOOD PRESSURE: 80 MMHG | SYSTOLIC BLOOD PRESSURE: 138 MMHG | WEIGHT: 203.8 LBS | HEIGHT: 71 IN | RESPIRATION RATE: 18 BRPM | BODY MASS INDEX: 28.53 KG/M2 | OXYGEN SATURATION: 100 % | HEART RATE: 66 BPM

## 2025-04-21 RX ORDER — METOPROLOL SUCCINATE 100 MG/1
100 TABLET, EXTENDED RELEASE ORAL DAILY
Qty: 90 TABLET | Refills: 1 | Status: SHIPPED | OUTPATIENT
Start: 2025-04-21

## 2025-04-21 RX ORDER — HYDROCHLOROTHIAZIDE 12.5 MG/1
TABLET ORAL
Qty: 90 TABLET | Refills: 1 | Status: SHIPPED | OUTPATIENT
Start: 2025-04-21

## 2025-04-21 RX ORDER — FENOFIBRATE 54 MG/1
TABLET ORAL
Qty: 90 TABLET | Refills: 1 | Status: SHIPPED | OUTPATIENT
Start: 2025-04-21

## 2025-04-21 RX ORDER — LISINOPRIL 40 MG/1
40 TABLET ORAL DAILY
Qty: 90 TABLET | Refills: 1 | Status: SHIPPED | OUTPATIENT
Start: 2025-04-21

## 2025-06-18 DIAGNOSIS — I10 ESSENTIAL HYPERTENSION WITH GOAL BLOOD PRESSURE LESS THAN 140/90: ICD-10-CM

## 2025-06-18 RX ORDER — AMLODIPINE BESYLATE 2.5 MG/1
2.5 TABLET ORAL DAILY
Qty: 90 TABLET | Refills: 0 | Status: SHIPPED | OUTPATIENT
Start: 2025-06-18

## (undated) RX ORDER — FENTANYL CITRATE 50 UG/ML
INJECTION, SOLUTION INTRAMUSCULAR; INTRAVENOUS
Status: DISPENSED
Start: 2022-10-19